# Patient Record
Sex: MALE | Race: BLACK OR AFRICAN AMERICAN | Employment: UNEMPLOYED | URBAN - METROPOLITAN AREA
[De-identification: names, ages, dates, MRNs, and addresses within clinical notes are randomized per-mention and may not be internally consistent; named-entity substitution may affect disease eponyms.]

---

## 2018-04-25 ENCOUNTER — OFFICE VISIT (OUTPATIENT)
Dept: FAMILY MEDICINE CLINIC | Age: 63
End: 2018-04-25

## 2018-04-25 VITALS
WEIGHT: 133.8 LBS | BODY MASS INDEX: 19.82 KG/M2 | OXYGEN SATURATION: 99 % | TEMPERATURE: 96.6 F | HEIGHT: 69 IN | SYSTOLIC BLOOD PRESSURE: 131 MMHG | DIASTOLIC BLOOD PRESSURE: 74 MMHG | HEART RATE: 80 BPM

## 2018-04-25 DIAGNOSIS — Z00.00 ANNUAL PHYSICAL EXAM: Primary | ICD-10-CM

## 2018-04-25 DIAGNOSIS — H54.8 LEGALLY BLIND: ICD-10-CM

## 2018-04-25 DIAGNOSIS — Z76.89 ENCOUNTER TO ESTABLISH CARE: ICD-10-CM

## 2018-04-25 DIAGNOSIS — E78.2 MIXED HYPERLIPIDEMIA: ICD-10-CM

## 2018-04-25 DIAGNOSIS — G47.00 INSOMNIA, UNSPECIFIED TYPE: ICD-10-CM

## 2018-04-25 DIAGNOSIS — Z77.22 TOBACCO SMOKE EXPOSURE: ICD-10-CM

## 2018-04-25 DIAGNOSIS — I10 ESSENTIAL HYPERTENSION: ICD-10-CM

## 2018-04-25 PROBLEM — I15.9 SECONDARY HYPERTENSION: Status: ACTIVE | Noted: 2018-04-25

## 2018-04-25 LAB
BILIRUB UR QL STRIP: NEGATIVE
GLUCOSE UR-MCNC: NEGATIVE MG/DL
KETONES P FAST UR STRIP-MCNC: NEGATIVE MG/DL
PH UR STRIP: 5.5 [PH] (ref 4.6–8)
PROT UR QL STRIP: NEGATIVE
SP GR UR STRIP: 1.02 (ref 1–1.03)
UA UROBILINOGEN AMB POC: NORMAL (ref 0.2–1)
URINALYSIS CLARITY POC: CLEAR
URINALYSIS COLOR POC: NORMAL
URINE BLOOD POC: NEGATIVE
URINE LEUKOCYTES POC: NEGATIVE
URINE NITRITES POC: NEGATIVE

## 2018-04-25 RX ORDER — TRAZODONE HYDROCHLORIDE 50 MG/1
50 TABLET ORAL
COMMUNITY
End: 2018-05-10

## 2018-04-25 NOTE — PROGRESS NOTES
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 He has been following up with Dr. Sulema Osborne at [facility], they are not taking his insurance anymore so he had to find a new primary care provider. She does not know none of his medication except had a bottle of trazodone. He is legally blind and he is here with his cousin who is his power of  as well. Records need to be requested from previous doctor. Today will include his physical as well so we can get all the blood work done, since he has not had his physical for this year, and it difficult for the patient to be brought back again. Past Medical History:   Diagnosis Date    Blindness 1996    Glaucoma     Hypercholesterolemia     Hypertension     Tremors of nervous system      Past Surgical History:   Procedure Laterality Date    HX HIP REPLACEMENT       Social History   Substance Use Topics    Smoking status: Current Every Day Smoker     Packs/day: 0.25    Smokeless tobacco: Never Used    Alcohol use No       History reviewed. No pertinent family history. Review of Systems   Constitutional: Negative for chills, fever, malaise/fatigue and weight loss. HENT: Negative for congestion, ear discharge, ear pain, hearing loss, nosebleeds, sinus pain and sore throat. Eyes: Negative for blurred vision, double vision and discharge. Respiratory: Negative for cough, hemoptysis, sputum production, shortness of breath and wheezing. Cardiovascular: Negative for chest pain, palpitations, claudication and leg swelling. Gastrointestinal: Negative for abdominal pain, constipation, diarrhea, nausea and vomiting. Genitourinary: Positive for frequency. Negative for dysuria and urgency. Musculoskeletal: Negative for back pain, joint pain, myalgias and neck pain. Skin: Negative for itching and rash. Neurological: Negative for dizziness, tingling, sensory change, speech change, focal weakness, weakness and headaches. Psychiatric/Behavioral: Negative for depression, hallucinations, substance abuse and suicidal ideas. The patient is not nervous/anxious and does not have insomnia. Physical Exam   Constitutional: He is oriented to person, place, and time. He appears well-developed and well-nourished. No distress. HENT:   Head: Normocephalic and atraumatic. Mouth/Throat: No oropharyngeal exudate. Legally blind in both eyes, the shape of sclera is white in color. Patient had a very poor dental hygiene and he barely have any teeth. He sees damage she was dental caries. Eyes: Conjunctivae are normal. Pupils are equal, round, and reactive to light. Right eye exhibits no discharge. Left eye exhibits no discharge. No scleral icterus. Neck: Normal range of motion. Neck supple. No thyromegaly present. Cardiovascular: Normal rate, regular rhythm and normal heart sounds. Pulmonary/Chest: Effort normal and breath sounds normal. No respiratory distress. He has no rales. Abdominal: Soft. Bowel sounds are normal. He exhibits no distension and no mass. There is no tenderness. There is no rebound. Musculoskeletal: Normal range of motion. He exhibits no edema, tenderness or deformity. Lymphadenopathy:     He has no cervical adenopathy. Neurological: He is alert and oriented to person, place, and time. No cranial nerve deficit. Coordination normal.   Skin: Skin is warm and dry. No rash noted. He is not diaphoretic. No erythema. Psychiatric: He has a normal mood and affect.  Judgment and thought content normal. Assessment and plan     1. Encounter to establish care      2. Insomnia, unspecified type  Patient has been taking trazodone at night 50 mg to sleep    3. Mixed hyperlipidemia      4. Legally blind      5. Tobacco smoke exposure      6. Annual physical exam    - CBC WITH AUTOMATED DIFF; Future  - LIPID PANEL; Future  - METABOLIC PANEL, COMPREHENSIVE; Future  - AMB POC URINALYSIS DIP STICK AUTO W/ MICRO  - CBC WITH AUTOMATED DIFF  - LIPID PANEL  - METABOLIC PANEL, COMPREHENSIVE    7. Essential hypertension  Blood pressure is 131/74 today I wait for the records from previous doctor and see what medication he is on    Current Outpatient Prescriptions   Medication Sig Dispense Refill    traZODone (DESYREL) 50 mg tablet Take 50 mg by mouth nightly. Patient Active Problem List    Diagnosis Date Noted    Insomnia 04/25/2018    Mixed hyperlipidemia 04/25/2018    Legally blind 04/25/2018    Essential hypertension 04/25/2018     No results found for this or any previous visit. No results found for any previous visit. Follow-up Disposition:  Return in about 1 week (around 5/2/2018).

## 2018-04-25 NOTE — MR AVS SNAPSHOT
[address] [facility] 
 
 
 [address] [facility] [address] [facility] [address] 
[phone] Patient: Tee Salinas MRN: [id number] Visit Information Date & Time Provider Department Dept. Phone Encounter #  
 4/25/2018  1:30 PM Cristina Mcneal MD [facility] [place] [phone] [id number] Follow-up Instructions Return in about 1 week (around 5/2/2018). Your Appointments 5/10/2018  1:30 PM  
Office Visit with Cristina Mcneal MD  
[address] [facility]) Appt Note: 2 week f/u from 4/25/18  
 [address] [facility] [address] [place] [address]  
[phone]  
  
   
 [facility] [address] [id number] Upcoming Health Maintenance Date Due Hepatitis C Screening 1955 DTaP/Tdap/Td series (1 - Tdap) 6/12/1978 FOBT Q 1 YEAR AGE 50-75 6/12/2007 ZOSTER VACCINE AGE 60> 4/12/2017 Influenza Age 5 to Adult 8/1/2017 Allergies as of 4/25/2018  Review Complete On: 4/25/2018 By: Cristina Mcneal MD  
 Not on File Current Immunizations  Never Reviewed No immunizations on file. Not reviewed this visit You Were Diagnosed With   
  
 Codes Comments Annual physical exam    -  Primary ICD-10-CM: Z00.00 ICD-9-CM: V70.0 Encounter to establish care     ICD-10-CM: Z76.89 
ICD-9-CM: V65.8 Insomnia, unspecified type     ICD-10-CM: G47.00 ICD-9-CM: 780.52 Mixed hyperlipidemia     ICD-10-CM: E78.2 ICD-9-CM: 272.2 Legally blind     ICD-10-CM: H54.8 ICD-9-CM: 369.4 Tobacco smoke exposure     ICD-10-CM: Z77.22 
ICD-9-CM: V15.89 Essential hypertension     ICD-10-CM: I10 
ICD-9-CM: 401.9 Vitals  BP Pulse Temp Height(growth percentile) Weight(growth percentile) SpO2  
 131/74 (BP 1 Location: Right arm, BP Patient Position: Sitting) 80 96.6 °F (35.9 °C) (Oral) 5' 9\" (1.753 m) 133 lb 12.8 oz (60.7 kg) 99% BMI Smoking Status 19.76 kg/m2 Current Every Day Smoker BMI and BSA Data Body Mass Index Body Surface Area 19.76 kg/m 2 1.72 m 2 Your Updated Medication List  
  
   
This list is accurate as of 4/25/18  2:55 PM.  Always use your most recent med list.  
  
  
  
  
 traZODone 50 mg tablet Commonly known as:  Ashley  Take 50 mg by mouth nightly. We Performed the Following AMB POC URINALYSIS DIP STICK AUTO W/ MICRO [32015 CPT(R)] CBC WITH AUTOMATED DIFF [48644 CPT(R)] LIPID PANEL [34898 CPT(R)] METABOLIC PANEL, COMPREHENSIVE [08928 CPT(R)] Follow-up Instructions Return in about 1 week (around 5/2/2018). To-Do List   
 04/25/2018 Lab:  CBC WITH AUTOMATED DIFF   
  
 04/25/2018 Lab:  LIPID PANEL   
  
 04/25/2018 Lab:  METABOLIC PANEL, COMPREHENSIVE Introducing [facility]! Leif Sheppard introduces [employer] patient portal. Now you can access parts of your medical record, email your doctor's office, and request medication refills online. 1. In your internet browser, go to [email or web address] 2. Click on the First Time User? Click Here link in the Sign In box. You will see the New Member Sign Up page. 3. Enter your [employer] Access Code exactly as it appears below. You will not need to use this code after youve completed the sign-up process. If you do not sign up before the expiration date, you must request a new code. · [employer] Access Code: [email or web address] Expires: 7/24/2018  2:27 PM 
 
4. Enter the last four digits of your Social Security Number (xxxx) and Date of Birth (mm/dd/yyyy) as indicated and click Submit. You will be taken to the next sign-up page. 5. Create a [employer] ID. This will be your [employer] login ID and cannot be changed, so think of one that is secure and easy to remember. 6. Create a [employer] password. You can change your password at any time. 7. Enter your Password Reset Question and Answer. This can be used at a later time if you forget your password. 8. Enter your e-mail address. You will receive e-mail notification when new information is available in [address] 9. Click Sign Up. You can now view and download portions of your medical record. 10. Click the Download Summary menu link to download a portable copy of your medical information. If you have questions, please visit the Frequently Asked Questions section of the [employer] website. Remember, [employer] is NOT to be used for urgent needs. For medical emergencies, dial 911. Now available from your iPhone and Android! Please provide this summary of care documentation to your next provider. Your primary care clinician is listed as Romero Shore. If you have any questions after today's visit, please call [phone].

## 2018-04-25 NOTE — PROGRESS NOTES
Margie Roth is a 63 y.o. male (: 1955) presenting to address:    No chief complaint on file. Vitals:    18 1405   BP: 131/74   Pulse: 80   Temp: 96.6 °F (35.9 °C)   TempSrc: Oral   SpO2: 99%   Weight: 133 lb 12.8 oz (60.7 kg)   Height: 5' 9\" (1.753 m)   PainSc:   0 - No pain       Hearing/Vision:   No exam data present    Learning Assessment:     Learning Assessment 2018   PRIMARY LEARNER Patient   HIGHEST LEVEL OF EDUCATION - PRIMARY LEARNER  GRADUATED HIGH SCHOOL OR GED   BARRIERS PRIMARY LEARNER VISUAL   CO-LEARNER CAREGIVER Yes   CO-LEARNER HIGHEST LEVEL OF EDUCATION GRADUATED HIGH SCHOOL OR GED   PRIMARY LANGUAGE ENGLISH   LEARNER PREFERENCE PRIMARY LISTENING   ANSWERED BY Lacey PRECIADO OTHER     Depression Screening:     PHQ over the last two weeks 2018   Little interest or pleasure in doing things Not at all   Feeling down, depressed or hopeless Not at all   Total Score PHQ 2 0     Fall Risk Assessment:     Fall Risk Assessment, last 12 mths 2018   Able to walk? Yes   Fall in past 12 months? No     Abuse Screening:     Abuse Screening Questionnaire 2018   Do you ever feel afraid of your partner? N   Are you in a relationship with someone who physically or mentally threatens you? N   Is it safe for you to go home? Y     Coordination of Care Questionaire:   1. Have you been to the ER, urgent care clinic since your last visit? Hospitalized since your last visit? NO    2. Have you seen or consulted any other health care providers outside of the [address] [place] [address] since your last visit? Include any pap smears or colon screening.  NO

## 2018-04-26 LAB
A-G RATIO,AGRAT: 1.4 RATIO (ref 1.1–2.6)
ABSOLUTE LYMPHOCYTE COUNT, 10803: 2.7 K/UL (ref 1–4.8)
ALBUMIN SERPL-MCNC: 4.4 G/DL (ref 3.5–5)
ALP SERPL-CCNC: 105 U/L (ref 40–125)
ALT SERPL-CCNC: 13 U/L (ref 5–40)
ANION GAP SERPL CALC-SCNC: 17 MMOL/L
AST SERPL W P-5'-P-CCNC: 17 U/L (ref 10–37)
BASOPHILS # BLD: 0 K/UL (ref 0–0.2)
BASOPHILS NFR BLD: 1 % (ref 0–2)
BILIRUB SERPL-MCNC: 0.4 MG/DL (ref 0.2–1.2)
BUN SERPL-MCNC: 14 MG/DL (ref 6–22)
CALCIUM SERPL-MCNC: 9.3 MG/DL (ref 8.4–10.4)
CHLORIDE SERPL-SCNC: 97 MMOL/L (ref 98–110)
CHOLEST SERPL-MCNC: 220 MG/DL (ref 110–200)
CO2 SERPL-SCNC: 26 MMOL/L (ref 20–32)
CREAT SERPL-MCNC: 0.9 MG/DL (ref 0.8–1.6)
EOSINOPHIL # BLD: 0.1 K/UL (ref 0–0.5)
EOSINOPHIL NFR BLD: 1 % (ref 0–6)
ERYTHROCYTE [DISTWIDTH] IN BLOOD BY AUTOMATED COUNT: 14.8 % (ref 10–15.5)
GFRAA, 66117: >60
GFRNA, 66118: >60
GLOBULIN,GLOB: 3.1 G/DL (ref 2–4)
GLUCOSE SERPL-MCNC: 78 MG/DL (ref 70–99)
GRANULOCYTES,GRANS: 47 % (ref 40–75)
HCT VFR BLD AUTO: 42.7 % (ref 39.3–51.6)
HDLC SERPL-MCNC: 4.8 MG/DL (ref 0–5)
HDLC SERPL-MCNC: 46 MG/DL (ref 40–59)
HGB BLD-MCNC: 14 G/DL (ref 13.1–17.2)
LDLC SERPL CALC-MCNC: 143 MG/DL (ref 50–99)
LYMPHOCYTES, LYMLT: 43 % (ref 20–45)
MCH RBC QN AUTO: 30 PG (ref 26–34)
MCHC RBC AUTO-ENTMCNC: 33 G/DL (ref 31–36)
MCV RBC AUTO: 91 FL (ref 80–95)
MONOCYTES # BLD: 0.5 K/UL (ref 0.1–1)
MONOCYTES NFR BLD: 8 % (ref 3–12)
NEUTROPHILS # BLD AUTO: 3 K/UL (ref 1.8–7.7)
PLATELET # BLD AUTO: 307 K/UL (ref 140–440)
PMV BLD AUTO: 9.6 FL (ref 9–13)
POTASSIUM SERPL-SCNC: 4.5 MMOL/L (ref 3.5–5.5)
PROT SERPL-MCNC: 7.5 G/DL (ref 6.2–8.1)
RBC # BLD AUTO: 4.71 M/UL (ref 3.8–5.8)
SODIUM SERPL-SCNC: 140 MMOL/L (ref 133–145)
TRIGL SERPL-MCNC: 152 MG/DL (ref 40–149)
VLDLC SERPL CALC-MCNC: 30 MG/DL (ref 8–30)
WBC # BLD AUTO: 6.3 K/UL (ref 4–11)

## 2018-05-02 ENCOUNTER — TELEPHONE (OUTPATIENT)
Dept: FAMILY MEDICINE CLINIC | Age: 63
End: 2018-05-02

## 2018-05-02 NOTE — TELEPHONE ENCOUNTER
Ryan Buck is calling from [place]. The patient has been assigned a . You have been invited to participate in a  conference call May 9, 2018 @ 2:00pm in regards to patients care. Doctor or nurse may participate [phone] meeting # [id number].

## 2018-05-10 ENCOUNTER — OFFICE VISIT (OUTPATIENT)
Dept: FAMILY MEDICINE CLINIC | Age: 63
End: 2018-05-10

## 2018-05-10 VITALS
HEIGHT: 69 IN | HEART RATE: 86 BPM | DIASTOLIC BLOOD PRESSURE: 80 MMHG | RESPIRATION RATE: 17 BRPM | SYSTOLIC BLOOD PRESSURE: 141 MMHG | WEIGHT: 134 LBS | BODY MASS INDEX: 19.85 KG/M2 | TEMPERATURE: 97.7 F | OXYGEN SATURATION: 100 %

## 2018-05-10 DIAGNOSIS — I10 ESSENTIAL HYPERTENSION: ICD-10-CM

## 2018-05-10 DIAGNOSIS — G47.00 INSOMNIA, UNSPECIFIED TYPE: ICD-10-CM

## 2018-05-10 DIAGNOSIS — Z12.11 SCREENING FOR COLON CANCER: ICD-10-CM

## 2018-05-10 DIAGNOSIS — E78.2 MIXED HYPERLIPIDEMIA: Primary | ICD-10-CM

## 2018-05-10 DIAGNOSIS — H61.23 BILATERAL IMPACTED CERUMEN: ICD-10-CM

## 2018-05-10 RX ORDER — ATORVASTATIN CALCIUM 40 MG/1
40 TABLET, FILM COATED ORAL DAILY
Qty: 30 TAB | Refills: 2 | Status: SHIPPED | OUTPATIENT
Start: 2018-05-10 | End: 2018-07-02 | Stop reason: SDUPTHER

## 2018-05-10 RX ORDER — DIPHENHYDRAMINE HCL 25 MG
25 CAPSULE ORAL
COMMUNITY
End: 2020-06-30

## 2018-05-10 RX ORDER — TRAZODONE HYDROCHLORIDE 50 MG/1
50 TABLET ORAL
Qty: 90 TAB | Refills: 0 | Status: SHIPPED | OUTPATIENT
Start: 2018-05-10 | End: 2018-12-10 | Stop reason: SDUPTHER

## 2018-05-10 RX ORDER — LOSARTAN POTASSIUM 50 MG/1
50 TABLET ORAL DAILY
Qty: 30 TAB | Refills: 2 | Status: SHIPPED | OUTPATIENT
Start: 2018-05-10 | End: 2018-07-02 | Stop reason: SDUPTHER

## 2018-05-10 NOTE — PROGRESS NOTES
Mario Lazo is a 63 y.o. male presents to office for 2 week follow up and left ear clogged and nervous        Health Maintenance items with a due date reviewed with patient:  Health Maintenance Due   Topic Date Due    Hepatitis C Screening  1955    Pneumococcal 19-64 Medium Risk (1 of 1 - PPSV23) 06/12/1976    DTaP/Tdap/Td series (1 - Tdap) 06/12/1978    FOBT Q 1 YEAR AGE 50-75  06/12/2007    ZOSTER VACCINE AGE 60>  04/12/2017

## 2018-05-10 NOTE — PROGRESS NOTES
Ej Cruz     Chief Complaint   Patient presents with    Anxiety    Ear Fullness     Vitals:    05/10/18 1338   BP: 141/80   Pulse: 86   Resp: 17   Temp: 97.7 °F (36.5 °C)   TempSrc: Oral   SpO2: 100%   Weight: 134 lb (60.8 kg)   Height: 5' 9\" (1.753 m)   PainSc:   0 - No pain         HPI: Patient is here for follow-up on his blood work, hypertension, and hyperlipidemia and insomnia medication refill. We have not received the records from his doctor yet. He is complaining about decreased hearing for both ears especially the right side for a few weeks. Otherwise he has no other complaints    Past Medical History:   Diagnosis Date    Blindness 1996    Glaucoma     Hypercholesterolemia     Hypertension     Tremors of nervous system      Past Surgical History:   Procedure Laterality Date    HX HIP REPLACEMENT       Social History   Substance Use Topics    Smoking status: Current Every Day Smoker     Packs/day: 0.25    Smokeless tobacco: Never Used    Alcohol use No       History reviewed. No pertinent family history. Review of Systems   Constitutional: Negative for chills, fever, malaise/fatigue and weight loss. HENT: Positive for hearing loss. Negative for congestion, ear discharge, ear pain, nosebleeds, sinus pain and sore throat. Decreased hearing in the right side   Eyes: Negative for blurred vision, double vision and discharge. Respiratory: Negative for cough, hemoptysis, sputum production, shortness of breath and wheezing. Cardiovascular: Negative for chest pain, palpitations, claudication and leg swelling. Gastrointestinal: Positive for heartburn. Negative for abdominal pain, constipation, diarrhea, nausea and vomiting. Genitourinary: Negative for dysuria, frequency and urgency. Musculoskeletal: Negative for back pain, joint pain, myalgias and neck pain. Skin: Negative for itching and rash.    Neurological: Negative for dizziness, tingling, sensory change, speech change, focal weakness, weakness and headaches. Psychiatric/Behavioral: Negative for depression, hallucinations, substance abuse and suicidal ideas. The patient has insomnia. The patient is not nervous/anxious. Physical Exam   Constitutional: He is oriented to person, place, and time. He appears well-developed and well-nourished. No distress. HENT:   Head: Normocephalic and atraumatic. Mouth/Throat: No oropharyngeal exudate. Bilateral cerumen impaction   Eyes: Conjunctivae are normal. Pupils are equal, round, and reactive to light. Right eye exhibits no discharge. Left eye exhibits no discharge. No scleral icterus. Neck: Normal range of motion. Neck supple. No thyromegaly present. Cardiovascular: Normal rate, regular rhythm and normal heart sounds. Pulmonary/Chest: Effort normal and breath sounds normal. No respiratory distress. He has no rales. Abdominal: Soft. Bowel sounds are normal. He exhibits no distension and no mass. There is no tenderness. There is no rebound. Musculoskeletal: Normal range of motion. He exhibits no edema, tenderness or deformity. Lymphadenopathy:     He has no cervical adenopathy. Neurological: He is alert and oriented to person, place, and time. No cranial nerve deficit. Coordination normal.   Skin: Skin is warm and dry. No rash noted. He is not diaphoretic. No erythema. Psychiatric: He has a normal mood and affect. Judgment and thought content normal.        Assessment and plan     1. Mixed hyperlipidemia  Hyperlipidemia will be started on atorvastatin 40 mg daily he is a smoker hypertension hyperlipidemia was high risk for coronary disease.    - atorvastatin (LIPITOR) 40 mg tablet; Take 1 Tab by mouth daily. Dispense: 30 Tab; Refill: 2    2. Essential hypertension  Since his old records has not arrived yet at our office will start him on losartan 50 mg daily  - losartan (COZAAR) 50 mg tablet; Take 1 Tab by mouth daily. Dispense: 30 Tab;  Refill: 2    3. Insomnia, unspecified type    - traZODone (DESYREL) 50 mg tablet; Take 1 Tab by mouth nightly. Dispense: 90 Tab; Refill: 0    4. Screening for colon cancer    [facility] ref [facility]    5. Bilateral impacted cerumen  Bilateral ear lavage was done by the nurse Caryl Phelan    Current Outpatient Prescriptions   Medication Sig Dispense Refill    losartan (COZAAR) 50 mg tablet Take 1 Tab by mouth daily. 30 Tab 2    atorvastatin (LIPITOR) 40 mg tablet Take 1 Tab by mouth daily. 30 Tab 2    diphenhydrAMINE (BENADRYL) 25 mg capsule Take 25 mg by mouth every six (6) hours as needed.  traZODone (DESYREL) 50 mg tablet Take 1 Tab by mouth nightly. 90 Tab 0       Patient Active Problem List    Diagnosis Date Noted    Insomnia 04/25/2018    Mixed hyperlipidemia 04/25/2018    Legally blind 04/25/2018    Essential hypertension 04/25/2018     Results for orders placed or performed in visit on 04/25/18   CBC WITH AUTOMATED DIFF   Result Value Ref Range    WBC 6.3 4.0 - 11.0 K/uL    RBC 4.71 3.80 - 5.80 M/uL    HGB 14.0 13.1 - 17.2 g/dL    HCT 42.7 39.3 - 51.6 %    MCV 91 80 - 95 fL    MCH 30 26 - 34 pg    MCHC 33 31 - 36 g/dL    RDW 14.8 10.0 - 15.5 %    PLATELET 245 897 - 458 K/uL    MPV 9.6 9.0 - 13.0 fL    NEUTROPHILS 47 40 - 75 %    Lymphocytes 43 20 - 45 %    MONOCYTES 8 3 - 12 %    EOSINOPHILS 1 0 - 6 %    BASOPHILS 1 0 - 2 %    ABS. NEUTROPHILS 3.0 1.8 - 7.7 K/uL    ABSOLUTE LYMPHOCYTE COUNT 2.7 1.0 - 4.8 K/uL    ABS. MONOCYTES 0.5 0.1 - 1.0 K/uL    ABS. EOSINOPHILS 0.1 0.0 - 0.5 K/uL    ABS.  BASOPHILS 0.0 0.0 - 0.2 K/uL   LIPID PANEL   Result Value Ref Range    Triglyceride 152 (H) 40 - 149 mg/dL    HDL Cholesterol 46 40 - 59 mg/dL    Cholesterol, total 220 (H) 110 - 200 mg/dL    CHOLESTEROL/HDL 4.8 0.0 - 5.0    LDL, calculated 143 (H) 50 - 99 mg/dL    VLDL, calculated 30 8 - 30 mg/dL   METABOLIC PANEL, COMPREHENSIVE   Result Value Ref Range    Glucose 78 70 - 99 mg/dL    BUN 14 6 - 22 mg/dL Creatinine 0.9 0.8 - 1.6 mg/dL    Sodium 140 133 - 145 mmol/L    Potassium 4.5 3.5 - 5.5 mmol/L    Chloride 97 (L) 98 - 110 mmol/L    CO2 26 20 - 32 mmol/L    AST (SGOT) 17 10 - 37 U/L    ALT (SGPT) 13 5 - 40 U/L    Alk.  phosphatase 105 40 - 125 U/L    Bilirubin, total 0.4 0.2 - 1.2 mg/dL    Calcium 9.3 8.4 - 10.4 mg/dL    Protein, total 7.5 6.2 - 8.1 g/dL    Albumin 4.4 3.5 - 5.0 g/dL    A-G Ratio 1.4 1.1 - 2.6 ratio    Globulin 3.1 2.0 - 4.0 g/dL    Anion gap 17.0 mmol/L    GFRAA >60.0 >60.0    GFRNA >60.0 >60.0   AMB POC URINALYSIS DIP STICK AUTO W/ MICRO   Result Value Ref Range    Color (UA POC) Orange     Clarity (UA POC) Clear     Glucose (UA POC) Negative Negative    Bilirubin (UA POC) Negative Negative    Ketones (UA POC) Negative Negative    Specific gravity (UA POC) 1.025 1.001 - 1.035    Blood (UA POC) Negative Negative    pH (UA POC) 5.5 4.6 - 8.0    Protein (UA POC) Negative Negative    Urobilinogen (UA POC) 0.2 mg/dL 0.2 - 1    Nitrites (UA POC) Negative Negative    Leukocyte esterase (UA POC) Negative Negative     Office Visit on 04/25/2018   Component Date Value Ref Range Status    Color (UA POC) 04/25/2018 [place]   Final    Clarity (UA POC) 04/25/2018 Clear   Final    Glucose (UA POC) 04/25/2018 Negative  Negative Final    Bilirubin (UA POC) 04/25/2018 Negative  Negative Final    Ketones (UA POC) 04/25/2018 Negative  Negative Final    Specific gravity (UA POC) 04/25/2018 1.025  1.001 - 1.035 Final    Blood (UA POC) 04/25/2018 Negative  Negative Final    pH (UA POC) 04/25/2018 5.5  4.6 - 8.0 Final    Protein (UA POC) 04/25/2018 Negative  Negative Final    Urobilinogen (UA POC) 04/25/2018 0.2 mg/dL  0.2 - 1 Final    Nitrites (UA POC) 04/25/2018 Negative  Negative Final    Leukocyte esterase (UA POC) 04/25/2018 Negative  Negative Final    WBC 04/25/2018 6.3  4.0 - 11.0 K/uL Final    RBC 04/25/2018 4.71  3.80 - 5.80 M/uL Final    HGB 04/25/2018 14.0  13.1 - 17.2 g/dL Final    HCT 04/25/2018 42.7  39.3 - 51.6 % Final    MCV 04/25/2018 91  80 - 95 fL Final    MCH 04/25/2018 30  26 - 34 pg Final    MCHC 04/25/2018 33  31 - 36 g/dL Final    RDW 04/25/2018 14.8  10.0 - 15.5 % Final    PLATELET 93/51/2114 778  140 - 440 K/uL Final    MPV 04/25/2018 9.6  9.0 - 13.0 fL Final    NEUTROPHILS 04/25/2018 47  40 - 75 % Final    Lymphocytes 04/25/2018 43  20 - 45 % Final    MONOCYTES 04/25/2018 8  3 - 12 % Final    EOSINOPHILS 04/25/2018 1  0 - 6 % Final    BASOPHILS 04/25/2018 1  0 - 2 % Final    ABS. NEUTROPHILS 04/25/2018 3.0  1.8 - 7.7 K/uL Final    ABSOLUTE LYMPHOCYTE COUNT 04/25/2018 2.7  1.0 - 4.8 K/uL Final    ABS. MONOCYTES 04/25/2018 0.5  0.1 - 1.0 K/uL Final    ABS. EOSINOPHILS 04/25/2018 0.1  0.0 - 0.5 K/uL Final    ABS. BASOPHILS 04/25/2018 0.0  0.0 - 0.2 K/uL Final    Triglyceride 04/25/2018 152* 40 - 149 mg/dL Final    HDL Cholesterol 04/25/2018 46  40 - 59 mg/dL Final    Cholesterol, total 04/25/2018 220* 110 - 200 mg/dL Final    CHOLESTEROL/HDL 04/25/2018 4.8  0.0 - 5.0 Final    LDL, calculated 04/25/2018 143* 50 - 99 mg/dL Final    VLDL, calculated 04/25/2018 30  8 - 30 mg/dL Final    Comment: Test includes cholesterol, HDL cholesterol, triglycerides and LDL. Cholesterol Recommended NCEP guidelines in mg/dL:  Less than 200            Desirable  200 - 239                Borderline High  Greater than or  = 240   High  Please Note:  Total Chol/HDL Ratio                   Men     Women  1/2 Avg. Risk    3.4     3.3      Avg. Risk    5.0     4.4  2X  Avg. Risk    9.6     7.1  3X  Avg.  Risk   23.4    11.0      Glucose 04/25/2018 78  70 - 99 mg/dL Final    BUN 04/25/2018 14  6 - 22 mg/dL Final    Creatinine 04/25/2018 0.9  0.8 - 1.6 mg/dL Final    Sodium 04/25/2018 140  133 - 145 mmol/L Final    Potassium 04/25/2018 4.5  3.5 - 5.5 mmol/L Final    Chloride 04/25/2018 97* 98 - 110 mmol/L Final    CO2 04/25/2018 26  20 - 32 mmol/L Final    AST (SGOT) 04/25/2018 17 10 - 37 U/L Final    ALT (SGPT) 04/25/2018 13  5 - 40 U/L Final    Alk. phosphatase 04/25/2018 105  40 - 125 U/L Final    Bilirubin, total 04/25/2018 0.4  0.2 - 1.2 mg/dL Final    Calcium 04/25/2018 9.3  8.4 - 10.4 mg/dL Final    Protein, total 04/25/2018 7.5  6.2 - 8.1 g/dL Final    Albumin 04/25/2018 4.4  3.5 - 5.0 g/dL Final    A-G Ratio 04/25/2018 1.4  1.1 - 2.6 ratio Final    Globulin 04/25/2018 3.1  2.0 - 4.0 g/dL Final    Anion gap 04/25/2018 17.0  mmol/L Final    Comment: Test includes Albumin, Alkaline Phosphatase, ALT, AST, BUN, Calcium, CO2,  Chloride, Creatinine, Glucose, Potassium, Sodium, Total Bilirubin and Total  Protein. Estimated GFR results are reported in mL/min/1.73 sq.m. by the MDRD equation. This eGFR is validated for stable chronic renal failure patients. This   equation  is unreliable in acute illness or patients with normal renal function.  GFRAA 04/25/2018 >60.0  >60.0 Final    GFRNA 04/25/2018 >60.0  >60.0 Final          Follow-up Disposition:  Return in about 3 months (around 8/10/2018).

## 2018-07-02 DIAGNOSIS — I10 ESSENTIAL HYPERTENSION: ICD-10-CM

## 2018-07-02 DIAGNOSIS — E78.2 MIXED HYPERLIPIDEMIA: ICD-10-CM

## 2018-07-02 RX ORDER — LOSARTAN POTASSIUM 50 MG/1
50 TABLET ORAL DAILY
Qty: 90 TAB | Refills: 1 | Status: SHIPPED | OUTPATIENT
Start: 2018-07-02 | End: 2018-11-27 | Stop reason: SDUPTHER

## 2018-07-02 RX ORDER — ATORVASTATIN CALCIUM 40 MG/1
40 TABLET, FILM COATED ORAL DAILY
Qty: 90 TAB | Refills: 1 | Status: SHIPPED | OUTPATIENT
Start: 2018-07-02 | End: 2018-11-27 | Stop reason: SDUPTHER

## 2018-07-02 NOTE — TELEPHONE ENCOUNTER
Dr. Camille Stroud, please see refill request for patient, thank you! Requested Prescriptions     Pending Prescriptions Disp Refills    atorvastatin (LIPITOR) 40 mg tablet 90 Tab 1     Sig: Take 1 Tab by mouth daily.

## 2018-07-09 ENCOUNTER — OFFICE VISIT (OUTPATIENT)
Dept: SURGERY | Age: 63
End: 2018-07-09

## 2018-07-09 VITALS
RESPIRATION RATE: 16 BRPM | HEIGHT: 69 IN | OXYGEN SATURATION: 99 % | HEART RATE: 71 BPM | SYSTOLIC BLOOD PRESSURE: 134 MMHG | BODY MASS INDEX: 19.4 KG/M2 | TEMPERATURE: 96.2 F | DIASTOLIC BLOOD PRESSURE: 86 MMHG | WEIGHT: 131 LBS

## 2018-07-09 DIAGNOSIS — Z12.11 ENCOUNTER FOR SCREENING COLONOSCOPY: Primary | ICD-10-CM

## 2018-07-09 NOTE — PROGRESS NOTES
Colon Screen    Patient: Maribel Centeno MRN: [id number]  SSN: [id number]    YOB: 1955  Age: 66 y.o. Sex: male        Subjective:   Maribel Centeno presents for colon screening. PCP is Fili Phillips MD. Patient denies rectal pain or bleeding. Abdominal surgeries as described below, specifically none. Patient has a bowel movement 1-2 per day. Patient denies changes in weight, bowel habits, or appetite. Patient has no known family history of colon cancer. Patient has never had a colonoscopy. No Known Allergies    Past Medical History:   Diagnosis Date    Blindness 1996    Glaucoma     Hypercholesterolemia     Hypertension     Tremors of nervous system      Past Surgical History:   Procedure Laterality Date    HX HIP REPLACEMENT        History reviewed. No pertinent family history. Social History   Substance Use Topics    Smoking status: Current Every Day Smoker     Packs/day: 0.25    Smokeless tobacco: Never Used    Alcohol use No      Prior to Admission medications    Medication Sig Start Date End Date Taking? Authorizing Provider   atorvastatin (LIPITOR) 40 mg tablet Take 1 Tab by mouth daily. 7/2/18  Yes Fili Phillips MD   losartan (COZAAR) 50 mg tablet Take 1 Tab by mouth daily. 7/2/18  Yes Fili Phillips MD   diphenhydrAMINE (BENADRYL) 25 mg capsule Take 25 mg by mouth every six (6) hours as needed. Yes Historical Provider   traZODone (DESYREL) 50 mg tablet Take 1 Tab by mouth nightly. 5/10/18  Yes Fili Phillips MD          Review of Systems:  Gastrointestinal: negative      Risks colonoscopy described- colon injury, missed lesion, anesthesia problems, bleeding. Colonoscopy preparation reviewed in detail with patient and a copy of the instructions was provided to the patient.        Dionna Melchor LPN  July 9, 3234  4:78 PM

## 2018-07-09 NOTE — PATIENT INSTRUCTIONS
If you have any questions or concerns about today's appointment, the verbal and/or written instructions you were given for follow up care, please call our office at [phone].     [facility] - [address]    [phone] office  [phone]

## 2018-07-10 RX ORDER — POLYETHYLENE GLYCOL 3350, SODIUM SULFATE ANHYDROUS, SODIUM BICARBONATE, SODIUM CHLORIDE, POTASSIUM CHLORIDE 236; 22.74; 6.74; 5.86; 2.97 G/4L; G/4L; G/4L; G/4L; G/4L
POWDER, FOR SOLUTION ORAL
Qty: 1 BOTTLE | Refills: 0 | Status: SHIPPED | OUTPATIENT
Start: 2018-07-10 | End: 2020-03-19

## 2018-07-10 NOTE — PROGRESS NOTES
[address]  [address] [address]  [phone]    Colonoscopy History and Physical    Patient: Johny Rausch MRN: [id number]  SSN: [id number]    YOB: 1955  Age: 66 y.o. Sex: male      Subjective:      Johny Rausch is a 66 y.o. male who presents for colonoscopy for   Screening colonoscopy. Patient has no complaints. Patient reports family history and abdominal surgeries as noted below. Past Medical History:   Diagnosis Date    Blindness 1996    Glaucoma     Hypercholesterolemia     Hypertension     Tremors of nervous system      Past Surgical History:   Procedure Laterality Date    HX HIP REPLACEMENT        History reviewed. No pertinent family history. Social History   Substance Use Topics    Smoking status: Current Every Day Smoker     Packs/day: 0.25    Smokeless tobacco: Never Used    Alcohol use No      Prior to Admission medications    Medication Sig Start Date End Date Taking? Authorizing Provider   atorvastatin (LIPITOR) 40 mg tablet Take 1 Tab by mouth daily. 7/2/18  Yes Yousif Davis MD   losartan (COZAAR) 50 mg tablet Take 1 Tab by mouth daily. 7/2/18  Yes Yousif Davis MD   diphenhydrAMINE (BENADRYL) 25 mg capsule Take 25 mg by mouth every six (6) hours as needed. Yes Historical Provider   traZODone (DESYREL) 50 mg tablet Take 1 Tab by mouth nightly. 5/10/18  Yes Yousif Davis MD        No Known Allergies    Review of Systems:  Review of systems performed with findings as noted.     Objective:     Vitals:    07/09/18 1450   BP: 134/86   Pulse: 71   Resp: 16   Temp: 96.2 °F (35.7 °C)   TempSrc: Oral   SpO2: 99%   Weight: 59.4 kg (131 lb)   Height: 5' 9\" (1.753 m)        Physical Exam:  GENERAL: alert, cooperative, no distress, appears stated age  LUNG: clear to auscultation bilaterally  HEART: regular rate and rhythm, S1, S2 normal, no murmur, click, rub or gallop  ABDOMEN: soft, non-tender. Bowel sounds normal. No masses,  no organomegaly  NEUROLOGIC: negative  PSYCHIATRIC: non focal    Assessment:   Fabian Pichardo is a 66 y.o. male who presents for colonoscopy for   Screening colonoscopy    Plan:   1. I recommend proceeding with colonoscopy. The patient was in full agreement and was eager to proceed. 2. I discussed the details of the colonoscopy procedure. The risks of colonoscopy were discussed including colon injury/perforation, anesthesia issues, bleeding, and the possibility of incomplete examination. The patient was willing to accept these risks and proceed with the examination. All questions were answered to the patient's satisfaction. 3. The patient was provided with the instructions in preparation for the colonoscopy procedure including the bowel prep recommendations.

## 2018-07-24 ENCOUNTER — TELEPHONE (OUTPATIENT)
Dept: SURGERY | Age: 63
End: 2018-07-24

## 2018-07-24 RX ORDER — POLYETHYLENE GLYCOL 3350, SODIUM SULFATE ANHYDROUS, SODIUM BICARBONATE, SODIUM CHLORIDE, POTASSIUM CHLORIDE 236; 22.74; 6.74; 5.86; 2.97 G/4L; G/4L; G/4L; G/4L; G/4L
POWDER, FOR SOLUTION ORAL
Qty: 1 BOTTLE | Refills: 0 | Status: SHIPPED | OUTPATIENT
Start: 2018-07-24 | End: 2018-07-26

## 2018-07-24 NOTE — PERIOP NOTES
PAT - SURGICAL PRE-ADMISSION INSTRUCTIONS    NAME:  Ashlie Newell                                                          TODAY'S DATE:  7/24/2018    SURGERY DATE:  7/26/2018                                  SURGERY ARRIVAL TIME:   1300    1. Do NOT eat or drink anything, including candy or gum, after MIDNIGHT on 7/25/18 , unless you have specific instructions from your Surgeon or Anesthesia Provider to do so. 2. No smoking on the day of surgery. 3. No alcohol 24 hours prior to the day of surgery. 4. No recreational drugs for one week prior to the day of surgery. 5. Leave all valuables, including money/purse, at home. 6. Remove all jewelry, nail polish, makeup (including mascara); no lotions, powders, deodorant, or perfume/cologne/after shave. 7. Glasses/Contact lenses and Dentures may be worn to the hospital.  They will be removed prior to surgery. 8. Call your doctor if symptoms of a cold or illness develop within 24 ours prior to surgery. 9. AN ADULT MUST DRIVE YOU HOME AFTER OUTPATIENT SURGERY. 10. If you are having an OUTPATIENT procedure, please make arrangements for a responsible adult to be with you for 24 hours after your surgery. 11. If you are admitted to the hospital, you will be assigned to a bed after surgery is complete. Normally a family member will not be able to see you until you are in your assigned bed. 15. Family is encouraged to accompany you to the hospital.  We ask visitors in the treatment area to be limited to ONE person at a time to ensure patient privacy. EXCEPTIONS WILL BE MADE AS NEEDED. 15. Children under 12 are discouraged from entering the treatment area and need to be supervised by an adult when in the waiting room. Special Instructions:    Bring list of CURRENT medications . , Take these medications the morning of surgery with a sip of water:  PER , Complete bowel prep per MD instructions.     Patient Prep:    shower with anti-bacterial soap    These surgical instructions were reviewed with PATIENT during the PAT PHONE CALL. Directions: On the morning of surgery, please go to the [facility]. Enter the building from the [facility] entrance, 1st floor (next to the Emergency Room entrance). Take the elevator to the 2nd floor. Sign in at the Registration Desk.     If you have any questions and/or concerns, please do not hesitate to call:  (Prior to the day of surgery)  hospitals unit:  [phone]  (Day of surgery)  [facility] unit:  [phone]

## 2018-07-25 ENCOUNTER — ANESTHESIA EVENT (OUTPATIENT)
Dept: ENDOSCOPY | Age: 63
End: 2018-07-25

## 2018-07-26 ENCOUNTER — HOSPITAL ENCOUNTER (OUTPATIENT)
Age: 63
Setting detail: OUTPATIENT SURGERY
Discharge: HOME | End: 2018-07-26
Attending: COLON & RECTAL SURGERY | Admitting: COLON & RECTAL SURGERY
Payer: MEDICARE

## 2018-07-26 ENCOUNTER — ANESTHESIA (OUTPATIENT)
Dept: ENDOSCOPY | Age: 63
End: 2018-07-26

## 2018-07-26 VITALS
SYSTOLIC BLOOD PRESSURE: 116 MMHG | WEIGHT: 132 LBS | HEIGHT: 71 IN | OXYGEN SATURATION: 98 % | DIASTOLIC BLOOD PRESSURE: 74 MMHG | TEMPERATURE: 97.3 F | BODY MASS INDEX: 18.48 KG/M2 | RESPIRATION RATE: 16 BRPM | HEART RATE: 81 BPM

## 2018-07-26 PROCEDURE — 74011250636 HC RX REV CODE- 250/636

## 2018-07-26 PROCEDURE — 76060000032 HC ANESTHESIA 0.5 TO 1 HR: Performed by: COLON & RECTAL SURGERY

## 2018-07-26 PROCEDURE — G0500 MOD SEDAT ENDO SERVICE >5YRS: HCPCS | Performed by: COLON & RECTAL SURGERY

## 2018-07-26 PROCEDURE — 74011250636 HC RX REV CODE- 250/636: Performed by: COLON & RECTAL SURGERY

## 2018-07-26 PROCEDURE — 76040000007: Performed by: COLON & RECTAL SURGERY

## 2018-07-26 PROCEDURE — 77030031670 HC DEV INFL ENDOTEK BIG60 MRTM -B: Performed by: COLON & RECTAL SURGERY

## 2018-07-26 RX ORDER — NALOXONE HYDROCHLORIDE 0.4 MG/ML
0.4 INJECTION, SOLUTION INTRAMUSCULAR; INTRAVENOUS; SUBCUTANEOUS
Status: DISCONTINUED | OUTPATIENT
Start: 2018-07-26 | End: 2018-07-26 | Stop reason: HOSPADM

## 2018-07-26 RX ORDER — ATROPINE SULFATE 0.1 MG/ML
0.5 INJECTION INTRAVENOUS
Status: DISCONTINUED | OUTPATIENT
Start: 2018-07-26 | End: 2018-07-26 | Stop reason: HOSPADM

## 2018-07-26 RX ORDER — DEXTROMETHORPHAN/PSEUDOEPHED 2.5-7.5/.8
1.2 DROPS ORAL
Status: DISCONTINUED | OUTPATIENT
Start: 2018-07-26 | End: 2018-07-26 | Stop reason: HOSPADM

## 2018-07-26 RX ORDER — FAMOTIDINE 20 MG/1
20 TABLET, FILM COATED ORAL ONCE
Status: DISCONTINUED | OUTPATIENT
Start: 2018-07-26 | End: 2018-07-26 | Stop reason: HOSPADM

## 2018-07-26 RX ORDER — SODIUM CHLORIDE 0.9 % (FLUSH) 0.9 %
5-10 SYRINGE (ML) INJECTION EVERY 8 HOURS
Status: DISCONTINUED | OUTPATIENT
Start: 2018-07-26 | End: 2018-07-26 | Stop reason: HOSPADM

## 2018-07-26 RX ORDER — EPINEPHRINE 0.1 MG/ML
1 INJECTION INTRACARDIAC; INTRAVENOUS
Status: DISCONTINUED | OUTPATIENT
Start: 2018-07-26 | End: 2018-07-26 | Stop reason: HOSPADM

## 2018-07-26 RX ORDER — MIDAZOLAM HYDROCHLORIDE 1 MG/ML
.25-5 INJECTION, SOLUTION INTRAMUSCULAR; INTRAVENOUS
Status: DISCONTINUED | OUTPATIENT
Start: 2018-07-26 | End: 2018-07-26 | Stop reason: HOSPADM

## 2018-07-26 RX ORDER — SODIUM CHLORIDE 9 MG/ML
50 INJECTION, SOLUTION INTRAVENOUS CONTINUOUS
Status: DISCONTINUED | OUTPATIENT
Start: 2018-07-26 | End: 2018-07-26 | Stop reason: HOSPADM

## 2018-07-26 RX ORDER — SODIUM CHLORIDE, SODIUM LACTATE, POTASSIUM CHLORIDE, CALCIUM CHLORIDE 600; 310; 30; 20 MG/100ML; MG/100ML; MG/100ML; MG/100ML
50 INJECTION, SOLUTION INTRAVENOUS CONTINUOUS
Status: DISCONTINUED | OUTPATIENT
Start: 2018-07-26 | End: 2018-07-26 | Stop reason: HOSPADM

## 2018-07-26 RX ORDER — SODIUM CHLORIDE 0.9 % (FLUSH) 0.9 %
5-10 SYRINGE (ML) INJECTION AS NEEDED
Status: DISCONTINUED | OUTPATIENT
Start: 2018-07-26 | End: 2018-07-26 | Stop reason: HOSPADM

## 2018-07-26 RX ORDER — MIDAZOLAM HYDROCHLORIDE 1 MG/ML
INJECTION, SOLUTION INTRAMUSCULAR; INTRAVENOUS
Status: DISCONTINUED
Start: 2018-07-26 | End: 2018-07-26 | Stop reason: HOSPADM

## 2018-07-26 RX ORDER — FLUMAZENIL 0.1 MG/ML
0.2 INJECTION INTRAVENOUS
Status: DISCONTINUED | OUTPATIENT
Start: 2018-07-26 | End: 2018-07-26 | Stop reason: HOSPADM

## 2018-07-26 RX ADMIN — SODIUM CHLORIDE 50 ML/HR: 900 INJECTION, SOLUTION INTRAVENOUS at 15:01

## 2018-07-26 NOTE — INTERVAL H&P NOTE
H&P Update:  Mai Hong was seen and examined. History and physical has been reviewed. The patient has been examined.  There have been no significant clinical changes since the completion of the originally dated History and Physical.    Signed By: Darylene Low, MD     July 26, 2018 2:56 PM

## 2018-07-26 NOTE — PERIOP NOTES
Cousin not answering from [facility]. No number recorded. Called number in Demographics and it goes straight to     1751 Number on Demographics was called and picked up. PT's cousin will return to hospital to  pt.

## 2018-07-26 NOTE — H&P (VIEW-ONLY)
[address]  [address] [address]  [phone]    Colonoscopy History and Physical    Patient: Catherine Molina MRN: [id number]  SSN: [id number]    YOB: 1955  Age: 66 y.o. Sex: male      Subjective:      Catherine Molina is a 66 y.o. male who presents for colonoscopy for   Screening colonoscopy. Patient has no complaints. Patient reports family history and abdominal surgeries as noted below. Past Medical History:   Diagnosis Date    Blindness 1996    Glaucoma     Hypercholesterolemia     Hypertension     Tremors of nervous system      Past Surgical History:   Procedure Laterality Date    HX HIP REPLACEMENT        History reviewed. No pertinent family history. Social History   Substance Use Topics    Smoking status: Current Every Day Smoker     Packs/day: 0.25    Smokeless tobacco: Never Used    Alcohol use No      Prior to Admission medications    Medication Sig Start Date End Date Taking? Authorizing Provider   atorvastatin (LIPITOR) 40 mg tablet Take 1 Tab by mouth daily. 7/2/18  Yes Jack Crain MD   losartan (COZAAR) 50 mg tablet Take 1 Tab by mouth daily. 7/2/18  Yes Jack Crain MD   diphenhydrAMINE (BENADRYL) 25 mg capsule Take 25 mg by mouth every six (6) hours as needed. Yes Historical Provider   traZODone (DESYREL) 50 mg tablet Take 1 Tab by mouth nightly. 5/10/18  Yes Jack Crain MD        No Known Allergies    Review of Systems:  Review of systems performed with findings as noted.     Objective:     Vitals:    07/09/18 1450   BP: 134/86   Pulse: 71   Resp: 16   Temp: 96.2 °F (35.7 °C)   TempSrc: Oral   SpO2: 99%   Weight: 59.4 kg (131 lb)   Height: 5' 9\" (1.753 m)        Physical Exam:  GENERAL: alert, cooperative, no distress, appears stated age  LUNG: clear to auscultation bilaterally  HEART: regular rate and rhythm, S1, S2 normal, no murmur, click, rub or gallop  ABDOMEN: soft, non-tender. Bowel sounds normal. No masses,  no organomegaly  NEUROLOGIC: negative  PSYCHIATRIC: non focal    Assessment:   Corina Carlson is a 66 y.o. male who presents for colonoscopy for   Screening colonoscopy    Plan:   1. I recommend proceeding with colonoscopy. The patient was in full agreement and was eager to proceed. 2. I discussed the details of the colonoscopy procedure. The risks of colonoscopy were discussed including colon injury/perforation, anesthesia issues, bleeding, and the possibility of incomplete examination. The patient was willing to accept these risks and proceed with the examination. All questions were answered to the patient's satisfaction. 3. The patient was provided with the instructions in preparation for the colonoscopy procedure including the bowel prep recommendations.

## 2018-07-26 NOTE — IP AVS SNAPSHOT
[address] 
[phone] Patient: Zhanna Arias MRN: [id number] EUB:[id number] About your hospitalization You were admitted on:  July 26, 2018 You last received care in the:  [facility] PHASE 2 RECOVERY You were discharged on:  July 26, 2018 Why you were hospitalized Your primary diagnosis was:  Not on File Follow-up Information Follow up With Details Comments Contact Info Valli Boas, MD   [address] [facility] [address] 
[phone] Maida Allison MD  Please conmtact Dr Bri Urrutia for any questions [address] [place] [address] [address] [facility] [address] [address] 
[phone] Your Scheduled Appointments Tuesday August 07, 2018  1:00 PM EDT Follow Up with Valli Boas, MD  
[address] [place] [address] [address] [address]  
[phone] Discharge Orders None A check jourdan indicates which time of day the medication should be taken. My Medications CHANGE how you take these medications Instructions Each Dose to Equal  
 Morning Noon Evening Bedtime PEG 3350-Electrolytes 236-22.74-6.74 -5.86 gram suspension Commonly known as:  GO-LYTELY What changed:  Another medication with the same name was removed. Continue taking this medication, and follow the directions you see here. Your last dose was: Your next dose is: Take as directed CONTINUE taking these medications Instructions Each Dose to Equal  
 Morning Noon Evening Bedtime  
 atorvastatin 40 mg tablet Commonly known as:  LIPITOR Your last dose was: Your next dose is: Take 1 Tab by mouth daily. 40 mg  
    
   
   
   
  
 BENADRYL 25 mg capsule Generic drug:  diphenhydrAMINE Your last dose was: Your next dose is: Take 25 mg by mouth every six (6) hours as needed. 25 mg  
    
   
   
   
  
 losartan 50 mg tablet Commonly known as:  COZAAR Your last dose was: Your next dose is: Take 1 Tab by mouth daily. 50 mg  
    
   
   
   
  
 traZODone 50 mg tablet Commonly known as:  Josh Alexander Your last dose was: Your next dose is: Take 1 Tab by mouth nightly. 50 mg Discharge Instructions Colonoscopy Discharge Instructions Fareed Fang 
[id number] COLONOSCOPY FINDINGS: 
Your colonoscopy showed: 1. Mild diverticulosis of the sigmoid colon. 2. Otherwise normal examination. FOLLOW UP RECOMMENDATIONS: 
 Dr. Panchito Melvin recommends your next colonoscopy in 10 years. DISCOMFORT: 
If you have redness at your IV site- apply warm compress to area; if redness or soreness persist- contact your physician There may be a slight amount of blood passed from the rectum, more than a teaspoon of bright red blood is not expected - contact your physician Gaseous discomfort is common- walking, belching will help relieve any gas pains. If discomfort persist- contact your physician DIET: 
 High fiber diet. ACTIVITY: 
You may resume your normal daily activities, however, it is recommended that you spend the remainder of the day resting - avoiding any strenuous activities. You may not operate a vehicle for 24 hours You may not engage in an occupation involving machinery or appliances for rest of today You may not drink alcoholic beverages for at least 24 hours Avoid making any critical decisions for at least 24 hour CALL M.D. ANY SIGN OF: Increasing pain, nausea, vomiting Abdominal distension (swelling) New increased bleeding Fever or chills Pain in chest area or shortness of breath Nabor Pérez MD, FACS, FASCRS Colon and Rectal Surgery [facility] Office [phone] Fax     [phone] DISCHARGE SUMMARY from Nurse PATIENT INSTRUCTIONS: 
 
After general anesthesia or intravenous sedation, for 24 hours or while taking prescription Narcotics: · Limit your activities · Do not drive and operate hazardous machinery · Do not make important personal or business decisions · Do  not drink alcoholic beverages · If you have not urinated within 8 hours after discharge, please contact your surgeon on call. Report the following to your surgeon: 
· Excessive pain, swelling, redness or odor of or around the surgical area · Temperature over 100.5 · Nausea and vomiting lasting longer than 4 hours or if unable to take medications · Any signs of decreased circulation or nerve impairment to extremity: change in color, persistent  numbness, tingling, coldness or increase pain · Any questions What to do at Home: These are general instructions for a healthy lifestyle: No smoking/ No tobacco products/ Avoid exposure to second hand smoke Surgeon General's Warning:  Quitting smoking now greatly reduces serious risk to your health. Obesity, smoking, and sedentary lifestyle greatly increases your risk for illness A healthy diet, regular physical exercise & weight monitoring are important for maintaining a healthy lifestyle You may be retaining fluid if you have a history of heart failure or if you experience any of the following symptoms:  Weight gain of 3 pounds or more overnight or 5 pounds in a week, increased swelling in our hands or feet or shortness of breath while lying flat in bed. Please call your doctor as soon as you notice any of these symptoms; do not wait until your next office visit. Recognize signs and symptoms of STROKE: 
 
F-face looks uneven A-arms unable to move or move unevenly S-speech slurred or non-existent T-time-call 911 as soon as signs and symptoms begin-DO NOT go  
 Back to bed or wait to see if you get better-TIME IS BRAIN. Warning Signs of HEART ATTACK Call 911 if you have these symptoms: 
? Chest discomfort. Most heart attacks involve discomfort in the center of the chest that lasts more than a few minutes, or that goes away and comes back. It can feel like uncomfortable pressure, squeezing, fullness, or pain. ? Discomfort in other areas of the upper body. Symptoms can include pain or discomfort in one or both arms, the back, neck, jaw, or stomach. ? Shortness of breath with or without chest discomfort. ? Other signs may include breaking out in a cold sweat, nausea, or lightheadedness. Don't wait more than five minutes to call [address]! Fast action can save your life. Calling 911 is almost always the fastest way to get lifesaving treatment. Emergency Medical Services staff can begin treatment when they arrive  up to an hour sooner than if someone gets to the hospital by car. The discharge information has been reviewed with the patient. The patient verbalized understanding. Discharge medications reviewed with the patient and appropriate educational materials and side effects teaching were provided. ___________________________________________________________________________________________________________________________________ Patient armband removed and given to patient to take home. Patient was informed of the privacy risks if armband lost or stolen Introducing [facility]! [employer] introduces [employer] patient portal. Now you can access parts of your medical record, email your doctor's office, and request medication refills online. 1. In your internet browser, go to [email or web address] 2. Click on the First Time User? Click Here link in the Sign In box. You will see the New Member Sign Up page. 3. Enter your MyChart Access Code exactly as it appears below.  You will not need to use this code after youve completed the sign-up process. If you do not sign up before the expiration date, you must request a new code. · PA[employer] Streamline Access Code: [email or web address] Expires: 10/24/2018  5:37 PM 
 
4. Enter the last four digits of your Social Security Number (xxxx) and Date of Birth (mm/dd/yyyy) as indicated and click Submit. You will be taken to the next sign-up page. 5. Create a PAX Streamline ID. This will be your [employer] Streamline login ID and cannot be changed, so think of one that is secure and easy to remember. 6. Create a [employer] Stream[employer] password. You can change your password at any time. 7. Enter your Password Reset Question and Answer. This can be used at a later time if you forget your password. 8. Enter your e-mail address. You will receive e-mail notification when new information is available in [address] 9. Click Sign Up. You can now view and download portions of your medical record. 10. Click the Download Summary menu link to download a portable copy of your medical information. If you have questions, please visit the Frequently Asked Questions section of the [employer] Streamline website. Remember, [employer]line is NOT to be used for urgent needs. For medical emergencies, dial 911. Now available from your iPhone and Android! Introducing Tai Mohamud As a [facility] patient, I wanted to make you aware of our electronic visit tool called Tai Christopherediramone. [facility] 24/7 allows you to connect within minutes with a medical provider 24 hours a day, seven days a week via a mobile device or tablet or logging into a secure website from your computer. You can access Tai Mohamud from anywhere in the [place].  
 
A virtual visit might be right for you when you have a simple condition and feel like you just dont want to get out of bed, or cant get away from work for an appointment, when your regular [facility] provider is not available (evenings, weekends or holidays), or when youre out of town and need minor care. Electronic visits cost only $49 and if the [employer] 24/7 provider determines a prescription is needed to treat your condition, one can be electronically transmitted to a nearby pharmacy*. Please take a moment to enroll today if you have not already done so. The enrollment process is free and takes just a few minutes. To enroll, please download the [employer] 24/7 eitan to your tablet or phone, or visit [email or web address] to enroll on your computer. And, as an [address] [place] [address] patient with a [employer] account, the results of your visits will be scanned into your electronic medical record and your primary care provider will be able to view the scanned results. We urge you to continue to see your regular [employer] provider for your ongoing medical care. And while your primary care provider may not be the one available when you seek a [employer] virtual visit, the peace of mind you get from getting a real diagnosis real time can be priceless. For more information on [employer], view our Frequently Asked Questions (FAQs) at [email or web address]. Sincerely, 
 
Braeden Castro MD 
Chief Medical Officer [facility] Reynolds *:  certain medications cannot be prescribed via [employer] Providers Seen During Your Hospitalization Provider Specialty Primary office phone Rob Short MD Colon and Rectal Surgery [phone] Your Primary Care Physician (PCP) Primary Care Physician Office Phone Office Fax [facility] Blood [phone] You are allergic to the following No active allergies Recent Documentation Height Weight BMI Smoking Status 1.803 m 59.9 kg 18.41 kg/m2 Former Smoker Emergency Contacts Name Discharge Info Relation Home Work Mobile Sohail Sanchez DISCHARGE CAREGIVER [3] Other Relative [6] [phone] Patient Belongings The following personal items are in your possession at time of discharge: 
  Dental Appliances: None Please provide this summary of care documentation to your next provider. Signatures-by signing, you are acknowledging that this After Visit Summary has been reviewed with you and you have received a copy. Patient Signature:  ____________________________________________________________ Date:  ____________________________________________________________  
  
[facility] Provider Signature:  ____________________________________________________________ Date:  ____________________________________________________________

## 2018-07-26 NOTE — DISCHARGE INSTRUCTIONS
Colonoscopy Discharge Instructions       Raheem Garcia  [id number]  1955      COLONOSCOPY FINDINGS:  Your colonoscopy showed: 1. Mild diverticulosis of the sigmoid colon. 2. Otherwise normal examination. FOLLOW UP RECOMMENDATIONS:   Dr. Juan Jose Agarwal recommends your next colonoscopy in 10 years. DISCOMFORT:  If you have redness at your IV site- apply warm compress to area; if redness or soreness persist- contact your physician  There may be a slight amount of blood passed from the rectum, more than a teaspoon of bright red blood is not expected - contact your physician  Gaseous discomfort is common- walking, belching will help relieve any gas pains. If discomfort persist- contact your physician    DIET:   High fiber diet. ACTIVITY:  You may resume your normal daily activities, however, it is recommended that you spend the remainder of the day resting - avoiding any strenuous activities. You may not operate a vehicle for 24 hours  You may not engage in an occupation involving machinery or appliances for rest of today  You may not drink alcoholic beverages for at least 24 hours  Avoid making any critical decisions for at least 24 hour    CALL M.D. ANY SIGN OF:   Increasing pain, nausea, vomiting  Abdominal distension (swelling)  New increased bleeding   Fever or chills  Pain in chest area or shortness of breath      Renny Bach MD, FACS, FASCRS  Colon and Rectal Surgery  [facility]  Office [phone]  Fax     [phone] SUMMARY from Nurse    PATIENT INSTRUCTIONS:    After general anesthesia or intravenous sedation, for 24 hours or while taking prescription Narcotics:  · Limit your activities  · Do not drive and operate hazardous machinery  · Do not make important personal or business decisions  · Do  not drink alcoholic beverages  · If you have not urinated within 8 hours after discharge, please contact your surgeon on call.     Report the following to your surgeon:  · Excessive pain, swelling, redness or odor of or around the surgical area  · Temperature over 100.5  · Nausea and vomiting lasting longer than 4 hours or if unable to take medications  · Any signs of decreased circulation or nerve impairment to extremity: change in color, persistent  numbness, tingling, coldness or increase pain  · Any questions    What to do at Home:  These are general instructions for a healthy lifestyle:    No smoking/ No tobacco products/ Avoid exposure to second hand smoke  Surgeon General's Warning:  Quitting smoking now greatly reduces serious risk to your health. Obesity, smoking, and sedentary lifestyle greatly increases your risk for illness    A healthy diet, regular physical exercise & weight monitoring are important for maintaining a healthy lifestyle    You may be retaining fluid if you have a history of heart failure or if you experience any of the following symptoms:  Weight gain of 3 pounds or more overnight or 5 pounds in a week, increased swelling in our hands or feet or shortness of breath while lying flat in bed. Please call your doctor as soon as you notice any of these symptoms; do not wait until your next office visit. Recognize signs and symptoms of STROKE:    F-face looks uneven    A-arms unable to move or move unevenly    S-speech slurred or non-existent    T-time-call 911 as soon as signs and symptoms begin-DO NOT go       Back to bed or wait to see if you get better-TIME IS BRAIN. Warning Signs of HEART ATTACK     Call 911 if you have these symptoms:   Chest discomfort. Most heart attacks involve discomfort in the center of the chest that lasts more than a few minutes, or that goes away and comes back. It can feel like uncomfortable pressure, squeezing, fullness, or pain.  Discomfort in other areas of the upper body. Symptoms can include pain or discomfort in one or both arms, the back, neck, jaw, or stomach.    Shortness of breath with or without chest discomfort.  Other signs may include breaking out in a cold sweat, nausea, or lightheadedness. Don't wait more than five minutes to call 911 - MINUTES MATTER! Fast action can save your life. Calling 911 is almost always the fastest way to get lifesaving treatment. Emergency Medical Services staff can begin treatment when they arrive -- up to an hour sooner than if someone gets to the hospital by car. The discharge information has been reviewed with the patient. The patient verbalized understanding. Discharge medications reviewed with the patient and appropriate educational materials and side effects teaching were provided. ___________________________________________________________________________________________________________________________________    Patient armband removed and given to patient to take home.   Patient was informed of the privacy risks if armband lost or stolen

## 2018-07-26 NOTE — PROCEDURES
Colonoscopy Procedure Note      Akin Salazar  1955  [id number]                Date of Procedure: 7/26/2018    Indications:    Screening colonoscopy     Preoperative diagnosis: COLON CANCER SCREENING Z12.11      Postoperative diagnosis: diverticulosis    Title of Procedure:  Colonoscopy, screening    :  Sandra Rosales MD    Assistant(s): Endoscopy Technician-1: Caridad العلي  Endoscopy RN-1: Og Humphries RN    Referring Source:  Akhil Blair MD    Sedation:  Demerol 50 mg IV,  Versed 2 mg IV      ASA Class: ASA 3 - Severe systemic disease but compensated        Procedure Details:    Prior to the procedure, a history and physical were performed. The patients medications, allergies and sensitivities were reviewed and all questions were answered. After informed consent was obtained for the procedure, with all risks and benefits of procedure explained. The patient was taken to the endoscopy suite and placed in the left lateral decubitus position. Patient identification and proposed procedure were verified prior to the procedure by the nurse and I. Following the  satisfactory administration of sedation,  the anus was inspected and appeared within normal limits. Digital rectal examination revealed Normal sphincter tone and squeeze pressure. Palpation revealed No Masses. Next the Olympus video colonoscope was introduced through the anus and advanced to cecum, which was identified by the ileocecal valve and appendiceal orifice, terminal ileum. The quality of preparation was good. The terminal ileum was visualized. The colonoscope was then slowly withdrawn and the mucosa carefully examined for any abnormalities. Cecal withdrawl time was greater than six minutes. The patient tolerated the procedure well. Findings:   Rectum: normal  Sigmoid: mild diverticulosis;   Descending Colon: normal  Transverse Colon: normal  Ascending Colon: normal  Cecum: normal  Terminal Ileum: normal      Interventions:  none    Specimen Removed: * No specimens in log *     Complications: None. EBL:  None. Impression:    diverticulosis     Recommendations: -Repeat colonoscopy in 10 years   Resume normal medication(s). Discharge Disposition:  Home in the company of a  when able to ambulate.         Jess Avalos MD, FACS, [facility]  Colon and Rectal Surgery  [facility]  Office [phone]  Fax     [phone]  7/26/2018  5:14 PM       [facility]  [id number] [place] [address]

## 2018-11-27 DIAGNOSIS — E78.2 MIXED HYPERLIPIDEMIA: ICD-10-CM

## 2018-11-27 DIAGNOSIS — I10 ESSENTIAL HYPERTENSION: ICD-10-CM

## 2018-11-27 RX ORDER — LOSARTAN POTASSIUM 50 MG/1
TABLET ORAL
Qty: 90 TAB | Refills: 1 | Status: SHIPPED | OUTPATIENT
Start: 2018-11-27 | End: 2018-12-10 | Stop reason: SDUPTHER

## 2018-11-27 RX ORDER — ATORVASTATIN CALCIUM 40 MG/1
TABLET, FILM COATED ORAL
Qty: 90 TAB | Refills: 1 | Status: SHIPPED | OUTPATIENT
Start: 2018-11-27 | End: 2018-12-10 | Stop reason: SDUPTHER

## 2018-12-10 ENCOUNTER — OFFICE VISIT (OUTPATIENT)
Dept: FAMILY MEDICINE CLINIC | Age: 63
End: 2018-12-10

## 2018-12-10 ENCOUNTER — HOSPITAL ENCOUNTER (OUTPATIENT)
Dept: LAB | Age: 63
Discharge: HOME | End: 2018-12-10
Payer: MEDICARE

## 2018-12-10 VITALS
SYSTOLIC BLOOD PRESSURE: 126 MMHG | RESPIRATION RATE: 17 BRPM | HEIGHT: 71 IN | BODY MASS INDEX: 19.88 KG/M2 | OXYGEN SATURATION: 100 % | WEIGHT: 142 LBS | HEART RATE: 96 BPM | TEMPERATURE: 98 F | DIASTOLIC BLOOD PRESSURE: 85 MMHG

## 2018-12-10 DIAGNOSIS — Z23 ENCOUNTER FOR IMMUNIZATION: ICD-10-CM

## 2018-12-10 DIAGNOSIS — G47.00 INSOMNIA, UNSPECIFIED TYPE: ICD-10-CM

## 2018-12-10 DIAGNOSIS — J30.9 ALLERGIC RHINITIS, UNSPECIFIED SEASONALITY, UNSPECIFIED TRIGGER: ICD-10-CM

## 2018-12-10 DIAGNOSIS — Z11.59 NEED FOR HEPATITIS C SCREENING TEST: Primary | ICD-10-CM

## 2018-12-10 DIAGNOSIS — E78.2 MIXED HYPERLIPIDEMIA: ICD-10-CM

## 2018-12-10 DIAGNOSIS — I10 ESSENTIAL HYPERTENSION: ICD-10-CM

## 2018-12-10 DIAGNOSIS — Z11.59 NEED FOR HEPATITIS C SCREENING TEST: ICD-10-CM

## 2018-12-10 LAB
ALBUMIN SERPL-MCNC: 3.7 G/DL (ref 3.4–5)
ALBUMIN/GLOB SERPL: 0.8 {RATIO} (ref 0.8–1.7)
ALP SERPL-CCNC: 135 U/L (ref 45–117)
ALT SERPL-CCNC: 16 U/L (ref 16–61)
ANION GAP SERPL CALC-SCNC: 6 MMOL/L (ref 3–18)
AST SERPL-CCNC: 19 U/L (ref 15–37)
BILIRUB SERPL-MCNC: 0.3 MG/DL (ref 0.2–1)
BUN SERPL-MCNC: 15 MG/DL (ref 7–18)
BUN/CREAT SERPL: 16 (ref 12–20)
CALCIUM SERPL-MCNC: 8.5 MG/DL (ref 8.5–10.1)
CHLORIDE SERPL-SCNC: 101 MMOL/L (ref 100–108)
CHOLEST SERPL-MCNC: 126 MG/DL
CO2 SERPL-SCNC: 29 MMOL/L (ref 21–32)
CREAT SERPL-MCNC: 0.94 MG/DL (ref 0.6–1.3)
GLOBULIN SER CALC-MCNC: 4.4 G/DL (ref 2–4)
GLUCOSE SERPL-MCNC: 75 MG/DL (ref 74–99)
HDLC SERPL-MCNC: 42 MG/DL (ref 40–60)
HDLC SERPL: 3 {RATIO} (ref 0–5)
LDLC SERPL CALC-MCNC: 64.8 MG/DL (ref 0–100)
LIPID PROFILE,FLP: NORMAL
POTASSIUM SERPL-SCNC: 3.8 MMOL/L (ref 3.5–5.5)
PROT SERPL-MCNC: 8.1 G/DL (ref 6.4–8.2)
SODIUM SERPL-SCNC: 136 MMOL/L (ref 136–145)
TRIGL SERPL-MCNC: 96 MG/DL (ref ?–150)
VLDLC SERPL CALC-MCNC: 19.2 MG/DL

## 2018-12-10 PROCEDURE — 80053 COMPREHEN METABOLIC PANEL: CPT

## 2018-12-10 PROCEDURE — 80061 LIPID PANEL: CPT

## 2018-12-10 PROCEDURE — 36415 COLL VENOUS BLD VENIPUNCTURE: CPT

## 2018-12-10 PROCEDURE — 86803 HEPATITIS C AB TEST: CPT

## 2018-12-10 RX ORDER — MONTELUKAST SODIUM 10 MG/1
10 TABLET ORAL DAILY
Qty: 30 TAB | Refills: 2 | Status: SHIPPED | OUTPATIENT
Start: 2018-12-10 | End: 2020-03-19 | Stop reason: SDUPTHER

## 2018-12-10 RX ORDER — LOSARTAN POTASSIUM 50 MG/1
50 TABLET ORAL DAILY
Qty: 90 TAB | Refills: 1 | Status: SHIPPED | OUTPATIENT
Start: 2018-12-10 | End: 2020-02-10

## 2018-12-10 RX ORDER — ATORVASTATIN CALCIUM 40 MG/1
40 TABLET, FILM COATED ORAL DAILY
Qty: 90 TAB | Refills: 1 | Status: SHIPPED | OUTPATIENT
Start: 2018-12-10 | End: 2020-02-10

## 2018-12-10 RX ORDER — TRAZODONE HYDROCHLORIDE 50 MG/1
50 TABLET ORAL
Qty: 90 TAB | Refills: 0 | Status: SHIPPED | OUTPATIENT
Start: 2018-12-10 | End: 2020-03-19 | Stop reason: DRUGHIGH

## 2018-12-10 NOTE — PROGRESS NOTES
Brian Diallo Chief Complaint Patient presents with  Hypertension  Cholesterol Problem Vitals:  
 12/10/18 1520 BP: 126/85 Pulse: 96  
Resp: 17 Temp: 98 °F (36.7 °C) TempSrc: Oral  
SpO2: 100% Weight: 142 lb (64.4 kg) Height: 5' 11\" (1.803 m) PainSc:   0 - No pain HPI:*** Past Medical History:  
Diagnosis Date  [address]  Glaucoma  Hypercholesterolemia  Hypertension  Tremors of nervous system Past Surgical History:  
Procedure Laterality Date  COLONOSCOPY N/A 2018 COLONOSCOPY performed by Alexandre Grajeda MD at [facility] Social History Tobacco Use  Smoking status: Former Smoker Last attempt to quit: 2018 Years since quittin.9  Smokeless tobacco: Never Used Substance Use Topics  Alcohol use: No  
 
 
History reviewed. No pertinent family history. Review of Systems Constitutional: Negative for chills, fever, malaise/fatigue and weight loss. HENT: Positive for congestion and sore throat. Negative for ear discharge, ear pain, hearing loss and nosebleeds. Eyes: Negative for blurred vision, double vision and discharge. Respiratory: Positive for cough. Negative for hemoptysis, sputum production, shortness of breath and wheezing. Cardiovascular: Negative for chest pain, palpitations, claudication and leg swelling. Gastrointestinal: Negative for abdominal pain, constipation, diarrhea, nausea and vomiting. Genitourinary: Negative for dysuria, frequency and urgency. Musculoskeletal: Negative for back pain, joint pain, myalgias and neck pain. Skin: Negative for itching and rash. Neurological: Negative for dizziness, tingling, sensory change, speech change, focal weakness, weakness and headaches. Psychiatric/Behavioral: Negative for depression, hallucinations, substance abuse and suicidal ideas. The patient has insomnia. The patient is not nervous/anxious. Physical Exam  
Constitutional: He is oriented to person, place, and time. He appears well-developed and well-nourished. No distress. HENT:  
Head: Normocephalic and atraumatic. Mouth/Throat: No oropharyngeal exudate. Eyes: Conjunctivae are normal. Pupils are equal, round, and reactive to light. Right eye exhibits no discharge. Left eye exhibits no discharge. No scleral icterus. Neck: Normal range of motion. Neck supple. No thyromegaly present. Cardiovascular: Normal rate, regular rhythm and normal heart sounds. Pulmonary/Chest: Effort normal and breath sounds normal. No respiratory distress. He has no rales. Abdominal: Soft. Bowel sounds are normal. He exhibits no distension and no mass. There is no tenderness. There is no rebound. Musculoskeletal: Normal range of motion. He exhibits no edema, tenderness or deformity. Lymphadenopathy:  
  He has no cervical adenopathy. Neurological: He is alert and oriented to person, place, and time. No cranial nerve deficit. Coordination normal.  
Skin: Skin is warm and dry. No rash noted. He is not diaphoretic. No erythema. Psychiatric: He has a normal mood and affect. Judgment and thought content normal.  
Nursing note and vitals reviewed. Assessment and plan 1. Mixed hyperlipidemia 
 
- atorvastatin (LIPITOR) 40 mg tablet; Take 1 Tab by mouth daily. Dispense: 90 Tab; Refill: 1 
- LIPID PANEL; Future 2. Essential hypertension 
 
- losartan (COZAAR) 50 mg tablet; Take 1 Tab by mouth daily. Dispense: 90 Tab; Refill: 1 
- METABOLIC PANEL, COMPREHENSIVE; Future 3. Insomnia, unspecified type 
 
- traZODone (DESYREL) 50 mg tablet; Take 1 Tab by mouth nightly. Dispense: 90 Tab; Refill: 0 
 
4. Need for hepatitis C screening test 
 
- HEPATITIS C AB; Future Current Outpatient Medications Medication Sig Dispense Refill  atorvastatin (LIPITOR) 40 mg tablet Take 1 Tab by mouth daily.  90 Tab 1  
  losartan (COZAAR) 50 mg tablet Take 1 Tab by mouth daily. 90 Tab 1  
 traZODone (DESYREL) 50 mg tablet Take 1 Tab by mouth nightly. 90 Tab 0  
 diphenhydrAMINE (BENADRYL) 25 mg capsule Take 25 mg by mouth every six (6) hours as needed.  PEG 3350-Electrolytes (GO-LYTELY) 236-22.74-6.74 -5.86 gram suspension Take as directed 1 Bottle 0 Patient Active Problem List  
 Diagnosis Date Noted  Insomnia 04/25/2018  Mixed hyperlipidemia 04/25/2018  Legally blind 04/25/2018  Essential hypertension 04/25/2018 Results for orders placed or performed in visit on 04/25/18 CBC WITH AUTOMATED DIFF Result Value Ref Range WBC 6.3 4.0 - 11.0 K/uL  
 RBC 4.71 3.80 - 5.80 M/uL  
 HGB 14.0 13.1 - 17.2 g/dL HCT 42.7 39.3 - 51.6 % MCV 91 80 - 95 fL  
 MCH 30 26 - 34 pg MCHC 33 31 - 36 g/dL  
 RDW 14.8 10.0 - 15.5 % PLATELET 961 139 - 980 K/uL MPV 9.6 9.0 - 13.0 fL  
 NEUTROPHILS 47 40 - 75 % Lymphocytes 43 20 - 45 % MONOCYTES 8 3 - 12 % EOSINOPHILS 1 0 - 6 % BASOPHILS 1 0 - 2 %  
 ABS. NEUTROPHILS 3.0 1.8 - 7.7 K/uL ABSOLUTE LYMPHOCYTE COUNT 2.7 1.0 - 4.8 K/uL  
 ABS. MONOCYTES 0.5 0.1 - 1.0 K/uL  
 ABS. EOSINOPHILS 0.1 0.0 - 0.5 K/uL  
 ABS. BASOPHILS 0.0 0.0 - 0.2 K/uL LIPID PANEL Result Value Ref Range Triglyceride 152 (H) 40 - 149 mg/dL HDL Cholesterol 46 40 - 59 mg/dL Cholesterol, total 220 (H) 110 - 200 mg/dL CHOLESTEROL/HDL 4.8 0.0 - 5.0 LDL, calculated 143 (H) 50 - 99 mg/dL VLDL, calculated 30 8 - 30 mg/dL METABOLIC PANEL, COMPREHENSIVE Result Value Ref Range Glucose 78 70 - 99 mg/dL BUN 14 6 - 22 mg/dL Creatinine 0.9 0.8 - 1.6 mg/dL Sodium 140 133 - 145 mmol/L Potassium 4.5 3.5 - 5.5 mmol/L Chloride 97 (L) 98 - 110 mmol/L  
 CO2 26 20 - 32 mmol/L  
 AST (SGOT) 17 10 - 37 U/L  
 ALT (SGPT) 13 5 - 40 U/L Alk. phosphatase 105 40 - 125 U/L Bilirubin, total 0.4 0.2 - 1.2 mg/dL Calcium 9.3 8.4 - 10.4 mg/dL Protein, total 7.5 6.2 - 8.1 g/dL Albumin 4.4 3.5 - 5.0 g/dL A-G Ratio 1.4 1.1 - 2.6 ratio Globulin 3.1 2.0 - 4.0 g/dL Anion gap 17.0 mmol/L  
 GFRAA >60.0 >60.0 GFRNA >60.0 >60.0 AMB POC URINALYSIS DIP STICK AUTO W/ MICRO Result Value Ref Range Color (UA POC) [place] Clarity (UA POC) Clear Glucose (UA POC) Negative Negative Bilirubin (UA POC) Negative Negative Ketones (UA POC) Negative Negative Specific gravity (UA POC) 1.025 1.001 - 1.035 Blood (UA POC) Negative Negative pH (UA POC) 5.5 4.6 - 8.0 Protein (UA POC) Negative Negative Urobilinogen (UA POC) 0.2 mg/dL 0.2 - 1 Nitrites (UA POC) Negative Negative Leukocyte esterase (UA POC) Negative Negative No visits with results within 3 Month(s) from this visit. Latest known visit with results is:  
Office Visit on 04/25/2018 Component Date Value Ref Range Status  Color (UA POC) 04/25/2018 [place]   Final  
 Clarity (UA POC) 04/25/2018 Clear   Final  
 Glucose (UA POC) 04/25/2018 Negative  Negative Final  
 Bilirubin (UA POC) 04/25/2018 Negative  Negative Final  
 Ketones (UA POC) 04/25/2018 Negative  Negative Final  
 Specific gravity (UA POC) 04/25/2018 1.025  1.001 - 1.035 Final  
 Blood (UA POC) 04/25/2018 Negative  Negative Final  
 pH (UA POC) 04/25/2018 5.5  4.6 - 8.0 Final  
 Protein (UA POC) 04/25/2018 Negative  Negative Final  
 Urobilinogen (UA POC) 04/25/2018 0.2 mg/dL  0.2 - 1 Final  
 Nitrites (UA POC) 04/25/2018 Negative  Negative Final  
 Leukocyte esterase (UA POC) 04/25/2018 Negative  Negative Final  
 WBC 04/25/2018 6.3  4.0 - 11.0 K/uL Final  
 RBC 04/25/2018 4.71  3.80 - 5.80 M/uL Final  
 HGB 04/25/2018 14.0  13.1 - 17.2 g/dL Final  
 HCT 04/25/2018 42.7  39.3 - 51.6 % Final  
 MCV 04/25/2018 91  80 - 95 fL Final  
 MCH 04/25/2018 30  26 - 34 pg Final  
 MCHC 04/25/2018 33  31 - 36 g/dL Final  
 RDW 04/25/2018 14.8  10.0 - 15.5 % Final  
  PLATELET 75/10/7593 133  140 - 440 K/uL Final  
 MPV 04/25/2018 9.6  9.0 - 13.0 fL Final  
 NEUTROPHILS 04/25/2018 47  40 - 75 % Final  
 Lymphocytes 04/25/2018 43  20 - 45 % Final  
 MONOCYTES 04/25/2018 8  3 - 12 % Final  
 EOSINOPHILS 04/25/2018 1  0 - 6 % Final  
 BASOPHILS 04/25/2018 1  0 - 2 % Final  
 ABS. NEUTROPHILS 04/25/2018 3.0  1.8 - 7.7 K/uL Final  
 ABSOLUTE LYMPHOCYTE COUNT 04/25/2018 2.7  1.0 - 4.8 K/uL Final  
 ABS. MONOCYTES 04/25/2018 0.5  0.1 - 1.0 K/uL Final  
 ABS. EOSINOPHILS 04/25/2018 0.1  0.0 - 0.5 K/uL Final  
 ABS. BASOPHILS 04/25/2018 0.0  0.0 - 0.2 K/uL Final  
 Triglyceride 04/25/2018 152* 40 - 149 mg/dL Final  
 HDL Cholesterol 04/25/2018 46  40 - 59 mg/dL Final  
 Cholesterol, total 04/25/2018 220* 110 - 200 mg/dL Final  
 CHOLESTEROL/HDL 04/25/2018 4.8  0.0 - 5.0 Final  
 LDL, calculated 04/25/2018 143* 50 - 99 mg/dL Final  
 VLDL, calculated 04/25/2018 30  8 - 30 mg/dL Final  
 Comment: Test includes cholesterol, HDL cholesterol, triglycerides and LDL. Cholesterol Recommended NCEP guidelines in mg/dL: 
Less than 200            Desirable 200 - 239                Borderline High Greater than or  = 240   High Please Note: Total Chol/HDL Ratio Men     Women 1/2 Avg. Risk    3.4     3.3 Avg. Risk    5.0     4.4 
2X  Avg. Risk    9.6     7.1 3X  Avg. Risk   23.4    11.0  Glucose 04/25/2018 78  70 - 99 mg/dL Final  
 BUN 04/25/2018 14  6 - 22 mg/dL Final  
 Creatinine 04/25/2018 0.9  0.8 - 1.6 mg/dL Final  
 Sodium 04/25/2018 140  133 - 145 mmol/L Final  
 Potassium 04/25/2018 4.5  3.5 - 5.5 mmol/L Final  
 Chloride 04/25/2018 97* 98 - 110 mmol/L Final  
 CO2 04/25/2018 26  20 - 32 mmol/L Final  
 AST (SGOT) 04/25/2018 17  10 - 37 U/L Final  
 ALT (SGPT) 04/25/2018 13  5 - 40 U/L Final  
 Alk.  phosphatase 04/25/2018 105  40 - 125 U/L Final  
 Bilirubin, total 04/25/2018 0.4  0.2 - 1.2 mg/dL Final  
  Calcium 04/25/2018 9.3  8.4 - 10.4 mg/dL Final  
 Protein, total 04/25/2018 7.5  6.2 - 8.1 g/dL Final  
 Albumin 04/25/2018 4.4  3.5 - 5.0 g/dL Final  
 A-G Ratio 04/25/2018 1.4  1.1 - 2.6 ratio Final  
 Globulin 04/25/2018 3.1  2.0 - 4.0 g/dL Final  
 Anion gap 04/25/2018 17.0  mmol/L Final  
 Comment: Test includes Albumin, Alkaline Phosphatase, ALT, AST, BUN, Calcium, CO2, Chloride, Creatinine, Glucose, Potassium, Sodium, Total Bilirubin and Total 
Protein. Estimated GFR results are reported in mL/min/1.73 sq.m. by the MDRD equation. This eGFR is validated for stable chronic renal failure patients. This  
equation 
is unreliable in acute illness or patients with normal renal function.  GFRAA 04/25/2018 >60.0  >60.0 Final  
 GFRNA 04/25/2018 >60.0  >60.0 Final  
  
 
 
Follow-up Disposition: Not on File

## 2018-12-11 LAB
HCV AB SER IA-ACNC: 0.07 INDEX
HCV AB SERPL QL IA: NEGATIVE
HCV COMMENT,HCGAC: NORMAL

## 2018-12-12 NOTE — PROGRESS NOTES
Hep C screening is negative, the patient cholesterol level has much improved continue same treatment.

## 2018-12-12 NOTE — PROGRESS NOTES
Romelia Edis     Chief Complaint   Patient presents with    Hypertension    Cholesterol Problem    Immunization/Injection     influenza     Vitals:    12/10/18 1520   BP: 126/85   Pulse: 96   Resp: 17   Temp: 98 °F (36.7 °C)   TempSrc: Oral   SpO2: 100%   Weight: 142 lb (64.4 kg)   Height: 5' 11\" (1.803 m)   PainSc:   0 - No pain         HPI: Mr. Anneliese Ugarte is here today for follow-up for hypertension which has been well controlled on losartan 50 mg daily patient has been adherent to his medications, he also has hyperlipidemia he is on Lipitor 40 mg daily lipid panel will be repeated today before follow-up. He has chronic insomnia and he used trazodone daily. Patient has been complaining up with nasal congestion runny nose for the last few month with postnasal drip, use any of the over-the-counter medication, he has no cough no nausea no vomiting no chest pain no shortness of breath no fever or chills. He cannot link nasal congestion and a runny nose stool specific allergen pets at home and he does not go outside much. Patient is legally blind for 22 years and he is here today with his cousin who is his power of     Past Medical History:   Diagnosis Date    Blindness 58    Glaucoma     Hypercholesterolemia     Hypertension     Tremors of nervous system      Past Surgical History:   Procedure Laterality Date    COLONOSCOPY N/A 2018    COL[facility]OSCOPY performed by Brian Bah MD at [facility]COPY    HX HIP REPLACEMENT       Social History     Tobacco Use    Smoking status: Former Smoker     Last attempt to quit: 2018     Years since quittin.9    Smokeless tobacco: Never Used   Substance Use Topics    Alcohol use: No       History reviewed. No pertinent family history. Review of Systems   Constitutional: Negative for chills, fever, malaise/fatigue and weight loss. HENT: Positive for congestion and sore throat. Negative for ear discharge, ear pain, hearing loss and nosebleeds. Eyes: Negative for blurred vision, double vision and discharge. Respiratory: Positive for cough. Negative for hemoptysis, sputum production, shortness of breath and wheezing. Cardiovascular: Negative for chest pain, palpitations, claudication and leg swelling. Gastrointestinal: Negative for abdominal pain, constipation, diarrhea, nausea and vomiting. Genitourinary: Negative for dysuria, frequency and urgency. Musculoskeletal: Negative for back pain, joint pain, myalgias and neck pain. Skin: Negative for itching and rash. Neurological: Negative for dizziness, tingling, sensory change, speech change, focal weakness, weakness and headaches. Psychiatric/Behavioral: Negative for depression, hallucinations, substance abuse and suicidal ideas. The patient has insomnia. The patient is not nervous/anxious. Physical Exam   Constitutional: He is oriented to person, place, and time. He appears well-developed and well-nourished. No distress. HENT:   Head: Normocephalic and atraumatic. Mouth/Throat: No oropharyngeal exudate. Eyes: Conjunctivae are normal. Pupils are equal, round, and reactive to light. Right eye exhibits no discharge. Left eye exhibits no discharge. No scleral icterus. Neck: Normal range of motion. Neck supple. No thyromegaly present. Cardiovascular: Normal rate, regular rhythm and normal heart sounds. Pulmonary/Chest: Effort normal and breath sounds normal. No respiratory distress. He has no rales. Abdominal: Soft. Bowel sounds are normal. He exhibits no distension and no mass. There is no tenderness. There is no rebound. Musculoskeletal: Normal range of motion. He exhibits no edema, tenderness or deformity. Lymphadenopathy:     He has no cervical adenopathy. Neurological: He is alert and oriented to person, place, and time. No cranial nerve deficit. Coordination normal.   Skin: Skin is warm and dry. No rash noted. He is not diaphoretic. No erythema.    Psychiatric: He has a normal mood and affect. Judgment and thought content normal.   Nursing note and vitals reviewed. Assessment and plan     1. Mixed hyperlipidemia  To Lipitor 40 and check lipid profile today    - atorvastatin (LIPITOR) 40 mg tablet; Take 1 Tab by mouth daily. Dispense: 90 Tab; Refill: 1  - LIPID PANEL; Future    2. Essential hypertension  Well-controlled continue same medications  - losartan (COZAAR) 50 mg tablet; Take 1 Tab by mouth daily. Dispense: 90 Tab; Refill: 1  - METABOLIC PANEL, COMPREHENSIVE; Future    3. Insomnia, unspecified type    - traZODone (DESYREL) 50 mg tablet; Take 1 Tab by mouth nightly. Dispense: 90 Tab; Refill: 0    4. Need for hepatitis C screening test    - HEPATITIS C AB; Future    Allergic rhinitis patient will be started on singular 1 tablet daily. Current Outpatient Medications   Medication Sig Dispense Refill    atorvastatin (LIPITOR) 40 mg tablet Take 1 Tab by mouth daily. 90 Tab 1    losartan (COZAAR) 50 mg tablet Take 1 Tab by mouth daily. 90 Tab 1    traZODone (DESYREL) 50 mg tablet Take 1 Tab by mouth nightly. 90 Tab 0    montelukast (SINGULAIR) 10 mg tablet Take 1 Tab by mouth daily. 30 Tab 2    diphenhydrAMINE (BENADRYL) 25 mg capsule Take 25 mg by mouth every six (6) hours as needed.       PEG 3350-Electrolytes (GO-LYTELY) 236-22.74-6.74 -5.86 gram suspension Take as directed 1 Bottle 0       Patient Active Problem List    Diagnosis Date Noted    Insomnia 04/25/2018    Mixed hyperlipidemia 04/25/2018    Legally blind 04/25/2018    Essential hypertension 04/25/2018     Results for orders placed or performed in visit on 04/25/18   CBC WITH AUTOMATED DIFF   Result Value Ref Range    WBC 6.3 4.0 - 11.0 K/uL    RBC 4.71 3.80 - 5.80 M/uL    HGB 14.0 13.1 - 17.2 g/dL    HCT 42.7 39.3 - 51.6 %    MCV 91 80 - 95 fL    MCH 30 26 - 34 pg    MCHC 33 31 - 36 g/dL    RDW 14.8 10.0 - 15.5 %    PLATELET 248 585 - 991 K/uL    MPV 9.6 9.0 - 13.0 fL    NEUTROPHILS 47 40 - 75 %    Lymphocytes 43 20 - 45 %    MONOCYTES 8 3 - 12 %    EOSINOPHILS 1 0 - 6 %    BASOPHILS 1 0 - 2 %    ABS. NEUTROPHILS 3.0 1.8 - 7.7 K/uL    ABSOLUTE LYMPHOCYTE COUNT 2.7 1.0 - 4.8 K/uL    ABS. MONOCYTES 0.5 0.1 - 1.0 K/uL    ABS. EOSINOPHILS 0.1 0.0 - 0.5 K/uL    ABS. BASOPHILS 0.0 0.0 - 0.2 K/uL   LIPID PANEL   Result Value Ref Range    Triglyceride 152 (H) 40 - 149 mg/dL    HDL Cholesterol 46 40 - 59 mg/dL    Cholesterol, total 220 (H) 110 - 200 mg/dL    CHOLESTEROL/HDL 4.8 0.0 - 5.0    LDL, calculated 143 (H) 50 - 99 mg/dL    VLDL, calculated 30 8 - 30 mg/dL   METABOLIC PANEL, COMPREHENSIVE   Result Value Ref Range    Glucose 78 70 - 99 mg/dL    BUN 14 6 - 22 mg/dL    Creatinine 0.9 0.8 - 1.6 mg/dL    Sodium 140 133 - 145 mmol/L    Potassium 4.5 3.5 - 5.5 mmol/L    Chloride 97 (L) 98 - 110 mmol/L    CO2 26 20 - 32 mmol/L    AST (SGOT) 17 10 - 37 U/L    ALT (SGPT) 13 5 - 40 U/L    Alk.  phosphatase 105 40 - 125 U/L    Bilirubin, total 0.4 0.2 - 1.2 mg/dL    Calcium 9.3 8.4 - 10.4 mg/dL    Protein, total 7.5 6.2 - 8.1 g/dL    Albumin 4.4 3.5 - 5.0 g/dL    A-G Ratio 1.4 1.1 - 2.6 ratio    Globulin 3.1 2.0 - 4.0 g/dL    Anion gap 17.0 mmol/L    GFRAA >60.0 >60.0    GFRNA >60.0 >60.0   AMB POC URINALYSIS DIP STICK AUTO W/ MICRO   Result Value Ref Range    Color (UA POC) Orange     Clarity (UA POC) Clear     Glucose (UA POC) Negative Negative    Bilirubin (UA POC) Negative Negative    Ketones (UA POC) Negative Negative    Specific gravity (UA POC) 1.025 1.001 - 1.035    Blood (UA POC) Negative Negative    pH (UA POC) 5.5 4.6 - 8.0    Protein (UA POC) Negative Negative    Urobilinogen (UA POC) 0.2 mg/dL 0.2 - 1    Nitrites (UA POC) Negative Negative    Leukocyte esterase (UA POC) Negative Negative     Hospital Outpatient Visit on 12/10/2018   Component Date Value Ref Range Status    Sodium 12/10/2018 136  136 - 145 mmol/L Final    Potassium 12/10/2018 3.8  3.5 - 5.5 mmol/L Final    Chloride 12/10/2018 101 100 - 108 mmol/L Final    CO2 12/10/2018 29  21 - 32 mmol/L Final    Anion gap 12/10/2018 6  3.0 - 18 mmol/L Final    Glucose 12/10/2018 75  74 - 99 mg/dL Final    BUN 12/10/2018 15  7.0 - 18 MG/DL Final    Creatinine 12/10/2018 0.94  0.6 - 1.3 MG/DL Final    BUN/Creatinine ratio 12/10/2018 16  12 - 20   Final    GFR est AA 12/10/2018 >60  >60 ml/min/1.73m2 Final    GFR est non-AA 12/10/2018 >60  >60 ml/min/1.73m2 Final    Comment: (NOTE)  Estimated GFR is calculated using the Modification of Diet in Renal   Disease (MDRD) Study equation, reported for both  Americans   (GFRAA) and non- Americans (GFRNA), and normalized to 1.73m2   body surface area. The physician must decide which value applies to   the patient. The MDRD study equation should only be used in   individuals age 25 or older. It has not been validated for the   following: pregnant women, patients with serious comorbid conditions,   or on certain medications, or persons with extremes of body size,   muscle mass, or nutritional status.  Calcium 12/10/2018 8.5  8.5 - 10.1 MG/DL Final    Bilirubin, total 12/10/2018 0.3  0.2 - 1.0 MG/DL Final    ALT (SGPT) 12/10/2018 16  16 - 61 U/L Final    AST (SGOT) 12/10/2018 19  15 - 37 U/L Final    Alk. phosphatase 12/10/2018 135* 45 - 117 U/L Final    Protein, total 12/10/2018 8.1  6.4 - 8.2 g/dL Final    Albumin 12/10/2018 3.7  3.4 - 5.0 g/dL Final    Globulin 12/10/2018 4.4* 2.0 - 4.0 g/dL Final    A-G Ratio 12/10/2018 0.8  0.8 - 1.7   Final    LIPID PROFILE 12/10/2018        Final    Cholesterol, total 12/10/2018 126  <200 MG/DL Final    Triglyceride 12/10/2018 96  <150 MG/DL Final    Comment: The drugs N-acetylcysteine (NAC) and  Metamiszole have been found to cause falsely  low results in this chemical assay. Please  be sure to submit blood samples obtained  BEFORE administration of either of these  drugs to assure correct results.       HDL Cholesterol 12/10/2018 42  40 - 60 MG/DL Final    LDL, calculated 12/10/2018 64.8  0 - 100 MG/DL Final    VLDL, calculated 12/10/2018 19.2  MG/DL Final    CHOL/HDL Ratio 12/10/2018 3.0  0 - 5.0   Final    Hepatitis C virus Ab 12/10/2018 0.07  <0.80 Index Final    Hep C  virus Ab Interp. 12/10/2018 NEGATIVE   NEG   Final    Hep C  virus Ab comment 12/10/2018        Final    Comment: Index <0.80. ......................... Cedric Ivey Negative  Index > or = to 0.80 and <1.00. .... Cedric Ivey Equivocal  Index >1.00. ......................... Cedric Ivey Positive          For Equivocal or Positive results, confirmation with Hepatitis C RNA by PCR or bDNA is suggested. Follow-up Disposition:  Return in about 3 months (around 3/10/2019) for as per results [id number] [facility] [address] .

## 2020-02-08 DIAGNOSIS — I10 ESSENTIAL HYPERTENSION: ICD-10-CM

## 2020-02-08 DIAGNOSIS — E78.2 MIXED HYPERLIPIDEMIA: ICD-10-CM

## 2020-02-10 RX ORDER — LOSARTAN POTASSIUM 50 MG/1
TABLET ORAL
Qty: 90 TAB | Refills: 0 | Status: SHIPPED | OUTPATIENT
Start: 2020-02-10 | End: 2020-03-19 | Stop reason: SDUPTHER

## 2020-02-10 RX ORDER — ATORVASTATIN CALCIUM 40 MG/1
TABLET, FILM COATED ORAL
Qty: 90 TAB | Refills: 0 | Status: SHIPPED | OUTPATIENT
Start: 2020-02-10 | End: 2020-03-19 | Stop reason: SDUPTHER

## 2020-02-10 NOTE — TELEPHONE ENCOUNTER
No answer and VM box not set up. Need to notify patient patient needs f/u appt before any further refills. Please schedule if patient returns call.

## 2020-03-19 ENCOUNTER — OFFICE VISIT (OUTPATIENT)
Dept: FAMILY MEDICINE CLINIC | Age: 65
End: 2020-03-19

## 2020-03-19 VITALS
OXYGEN SATURATION: 99 % | WEIGHT: 129 LBS | TEMPERATURE: 98.5 F | DIASTOLIC BLOOD PRESSURE: 62 MMHG | SYSTOLIC BLOOD PRESSURE: 95 MMHG | HEART RATE: 112 BPM | HEIGHT: 71 IN | RESPIRATION RATE: 20 BRPM | BODY MASS INDEX: 18.06 KG/M2

## 2020-03-19 DIAGNOSIS — Z12.5 PROSTATE CANCER SCREENING: ICD-10-CM

## 2020-03-19 DIAGNOSIS — I10 ESSENTIAL HYPERTENSION: ICD-10-CM

## 2020-03-19 DIAGNOSIS — H54.8 LEGALLY BLIND: ICD-10-CM

## 2020-03-19 DIAGNOSIS — G47.00 INSOMNIA, UNSPECIFIED TYPE: ICD-10-CM

## 2020-03-19 DIAGNOSIS — Z00.00 MEDICARE ANNUAL WELLNESS VISIT, SUBSEQUENT: Primary | ICD-10-CM

## 2020-03-19 DIAGNOSIS — J30.9 ALLERGIC RHINITIS, UNSPECIFIED SEASONALITY, UNSPECIFIED TRIGGER: ICD-10-CM

## 2020-03-19 DIAGNOSIS — E78.2 MIXED HYPERLIPIDEMIA: ICD-10-CM

## 2020-03-19 DIAGNOSIS — E55.9 VITAMIN D DEFICIENCY: ICD-10-CM

## 2020-03-19 DIAGNOSIS — Z12.11 COLON CANCER SCREENING: ICD-10-CM

## 2020-03-19 RX ORDER — MONTELUKAST SODIUM 10 MG/1
10 TABLET ORAL DAILY
Qty: 90 TAB | Refills: 0 | Status: SHIPPED | OUTPATIENT
Start: 2020-03-19 | End: 2020-06-30 | Stop reason: SDUPTHER

## 2020-03-19 RX ORDER — LOSARTAN POTASSIUM 25 MG/1
25 TABLET ORAL DAILY
Qty: 90 TAB | Refills: 1 | Status: SHIPPED | OUTPATIENT
Start: 2020-03-19 | End: 2020-09-20

## 2020-03-19 RX ORDER — ATORVASTATIN CALCIUM 40 MG/1
40 TABLET, FILM COATED ORAL DAILY
Qty: 90 TAB | Refills: 1 | Status: SHIPPED | OUTPATIENT
Start: 2020-03-19 | End: 2021-01-25

## 2020-03-19 RX ORDER — TRAZODONE HYDROCHLORIDE 100 MG/1
100 TABLET ORAL
Qty: 90 TAB | Refills: 0 | Status: SHIPPED | OUTPATIENT
Start: 2020-03-19 | End: 2020-06-30 | Stop reason: SDUPTHER

## 2020-03-19 RX ORDER — LOSARTAN POTASSIUM 50 MG/1
50 TABLET ORAL DAILY
Qty: 90 TAB | Refills: 1 | Status: SHIPPED | OUTPATIENT
Start: 2020-03-19 | End: 2020-03-19 | Stop reason: DRUGHIGH

## 2020-03-19 NOTE — PROGRESS NOTES
(AWV) The Initial Medicare Annual Wellness Exam PROGRESS NOTE    This is an Initial Medicare Annual Wellness Exam (AWV) (Performed 12 months after IPPE or effective date of Medicare Part B enrollment, Once in a lifetime)    I have reviewed the patient's medical history in detail and updated the computerized patient record. Dagoberto Flaherty is a 60 y.o.  male and presents for an annual wellness exam       Patient Active Problem List   Diagnosis Code    Insomnia G47.00    Mixed hyperlipidemia E78.2    Legally blind H54.8    Essential hypertension I10     Patient Active Problem List    Diagnosis Date Noted    Insomnia 2018    Mixed hyperlipidemia 2018    Legally blind 2018    Essential hypertension 2018     Current Outpatient Medications   Medication Sig Dispense Refill    montelukast (SINGULAIR) 10 mg tablet Take 1 Tab by mouth daily. 90 Tab 0    traZODone (DESYREL) 100 mg tablet Take 1 Tab by mouth nightly for 90 days. 90 Tab 0    atorvastatin (LIPITOR) 40 mg tablet Take 1 Tab by mouth daily. 90 Tab 1    losartan (COZAAR) 25 mg tablet Take 1 Tab by mouth daily. 90 Tab 1    diphenhydrAMINE (BENADRYL) 25 mg capsule Take 25 mg by mouth every six (6) hours as needed. No Known Allergies  Past Medical History:   Diagnosis Date    Blindness     Glaucoma     Hypercholesterolemia     Hypertension     Tremors of nervous system      Past Surgical History:   Procedure Laterality Date    COLONOSCOPY N/A 2018    COLONOSCOPY performed by Lencho Pavon MD at [facility] ENDOSCOPY    HX HIP REPLACEMENT       No family history on file.   Social History     Tobacco Use    Smoking status: Former Smoker     Last attempt to quit: 2018     Years since quittin.2    Smokeless tobacco: Never Used   Substance Use Topics    Alcohol use: No       ROS     History obtained from the patient AND HIS COUSIN   General ROS: negative for - chills, fatigue or fever  Psychological ROS: negative for - anxiety or depression  Ophthalmic ROS: Legally blind   ENT ROS: positive for - nasal congestion, nasal discharge and sneezing  Allergy and Immunology ROS: allergic rhinits  Hematological and Lymphatic ROS: negative for - bleeding problems or bruising  Endocrine ROS: negative for - skin changes or unexpected weight changes  Respiratory ROS: positive for - Dry cough  Cardiovascular ROS: no chest pain or dyspnea on exertion  Gastrointestinal ROS: no abdominal pain, change in bowel habits, or black or bloody stools  Genito-Urinary ROS: no dysuria, trouble voiding, or hematuria  Musculoskeletal ROS: negative  negative for - gait disturbance or joint pain  Neurological ROS: no TIA or stroke symptoms  Dermatological ROS: negative for - dry skin      History     Past Medical History:   Diagnosis Date    Blindness 58    Glaucoma     Hypercholesterolemia     Hypertension     Tremors of nervous system       Past Surgical History:   Procedure Laterality Date    COLONOSCOPY N/A 2018    COLON[facility]COPY performed by Nancy Holliday [facility] at [facility] END[facility]COPY    HX HIP REPLACEMENT       Current Outpatient Medications   Medication Sig Dispense Refill    montelukast (SINGULAIR) 10 mg tablet Take 1 Tab by mouth daily. 90 Tab 0    traZODone (DESYREL) 100 mg tablet Take 1 Tab by mouth nightly for 90 days. 90 Tab 0    atorvastatin (LIPITOR) 40 mg tablet Take 1 Tab by mouth daily. 90 Tab 1    losartan (COZAAR) 25 mg tablet Take 1 Tab by mouth daily. 90 Tab 1    diphenhydrAMINE (BENADRYL) 25 mg capsule Take 25 mg by mouth every six (6) hours as needed. No Known Allergies  No family history on file.   Social History     Tobacco Use    Smoking status: Former Smoker     Last attempt to quit: 2018     Years since quittin.2    Smokeless tobacco: Never Used   Substance Use Topics    Alcohol use: No     Patient Active Problem List   Diagnosis Code    Insomnia G47.00    Mixed hyperlipidemia E78.2    Legally blind H54.8    Essential hypertension I10       Health Maintenance History  Immunizations reviewed,  He and was advised  Get the shingle vaccine at the pharmacy also explained to his cousin     colonoscopy: was not done referred in 2018 to order [email or web address] today   Chest CT: Quit smoking 3 years ago   Eye exam: legally blind             Depression Risk Factor Screening:      Patient Health Questionnaire (PHQ-2)   Over the last 2 weeks, how often have you been bothered by any of the following problems? · Little interest or pleasure in doing things? · Not at all. [0]  · Feeling down, depressed, or hopeless? · Not at all. [0]    Total Score: 0/6  PHQ-2 Assessment Scoring:   A score of 2 or more requires further screening with the PHQ-9    Alcohol Risk Factor Screening:     Women: On any occasion during the past 3 months, have you had more than 3 drinks containing alcohol? Do you average more than 7 drinks per week? Men: On any occasion during the past 3 months, have you had more than 4 drinks containing alcohol? No   Do you average more than 14 drinks per week? No    Functional Ability and Level of Safety:     Hearing Loss    Hearing is good. Activities of Daily Living   Partial assistance. Requires assistance with: ambulation and no ADLs    Fall Risk   Impaired (20 pts)    Abuse Screen   Patient is not abused  None    Examination   Physical Examination  Vitals:    03/19/20 1458   BP: 95/62   Pulse: (!) 112   Resp: 20   Temp: 98.5 °F (36.9 °C)   TempSrc: Oral   SpO2: 99%   Weight: 129 lb (58.5 kg)   Height: 5' 11\" (1.803 m)   PainSc:   0 - No pain      Body mass index is 17.99 kg/m².      Evaluation of Cognitive Function:  Mood/affect: normal   Appearance:well groomed   Family member/caregiver input: Need blind stick     alert, well appearing, and in no distress    Patient Care Team:  Mena Ortiz MD as PCP - General (Internal Medicine)  Mena Ortiz MD as PCP - [facility] Empaneled Provider    End-of-life planning  Advanced Directive in the case than an injury or illness causes the patient to be unable to make health care decisions    Health Care Directive or Living Will: no patient cousin was handed the advanced directive to be completed and bring it back     Advice/Referrals/Counselling/Plan:   Education and counseling provided:  Are appropriate based on today's review and evaluation  Pneumococcal Vaccine  Influenza Vaccine  Colorectal cancer screening tests  Diabetes screening test  Include in education list (weight loss, physical activity, smoking cessation, fall prevention, and nutrition)    ICD-10-CM ICD-9-CM    1. Medicare annual wellness visit, subsequent Z00.00 V70.0    2. Allergic rhinitis, unspecified seasonality, unspecified trigger J30.9 477.9 montelukast (SINGULAIR) 10 mg tablet   3. Mixed hyperlipidemia E78.2 272.2 LIPID PANEL      atorvastatin (LIPITOR) 40 mg tablet   4. Essential hypertension I10 401.9 LIPID PANEL      losartan (COZAAR) 25 mg tablet      DISCONTINUED: losartan (COZAAR) 50 mg tablet   5. Insomnia, unspecified type G47.00 780.52 traZODone (DESYREL) 100 mg tablet   6. Legally blind H54.8 369.4 AMB SUPPLY ORDER   7. Colon cancer screening Z12.11 V76.51 COLOGUARD TEST (FECAL DNA COLORECTAL CANCER SCREENING)   8. Vitamin D deficiency E55.9 268.9 VITAMIN D, 25 HYDROXY     Encounter Diagnoses   Name Primary?     Medicare annual wellness visit, subsequent Yes    Allergic rhinitis, unspecified seasonality, unspecified trigger     Mixed hyperlipidemia     Essential hypertension     Insomnia, unspecified type     Legally blind     Colon cancer screening     Vitamin D deficiency      Orders Placed This Encounter    AMB SUPPLY ORDER    LIPID PANEL    COLOGUARD TEST (FECAL DNA COLORECTAL CANCER SCREENING)    VITAMIN D, 25 HYDROXY    montelukast (SINGULAIR) 10 mg tablet    traZODone (DESYREL) 100 mg tablet    atorvastatin (LIPITOR) 40 mg tablet  DISCONTD: losartan (COZAAR) 50 mg tablet    losartan (COZAAR) 25 mg tablet     Orders Placed This Encounter    AMB SUPPLY ORDER     Blind Stick for walking he is legally blind    LIPID PANEL     Standing Status:   Future     Standing Expiration Date:   3/20/2021    COLOGUARD TEST (FECAL DNA COLORECTAL CANCER SCREENING)    VITAMIN D, 25 HYDROXY     Standing Status:   Future     Standing Expiration Date:   3/20/2021    montelukast (SINGULAIR) 10 mg tablet     Sig: Take 1 Tab by mouth daily. Dispense:  90 Tab     Refill:  0    traZODone (DESYREL) 100 mg tablet     Sig: Take 1 Tab by mouth nightly for 90 days. Dispense:  90 Tab     Refill:  0    atorvastatin (LIPITOR) 40 mg tablet     Sig: Take 1 Tab by mouth daily. Dispense:  90 Tab     Refill:  1    DISCONTD: losartan (COZAAR) 50 mg tablet     Sig: Take 1 Tab by mouth daily. Dispense:  90 Tab     Refill:  1    losartan (COZAAR) 25 mg tablet     Sig: Take 1 Tab by mouth daily. Dispense:  90 Tab     Refill:  1     Orders Placed This Encounter    AMB SUPPLY ORDER    LIPID PANEL    COLOGUARD TEST (FECAL DNA COLORECTAL CANCER SCREENING)    VITAMIN D, 25 HYDROXY    montelukast (SINGULAIR) 10 mg tablet    traZODone (DESYREL) 100 mg tablet    atorvastatin (LIPITOR) 40 mg tablet    DISCONTD: losartan (COZAAR) 50 mg tablet    losartan (COZAAR) 25 mg tablet     Diagnoses and all orders for this visit:    1. Medicare annual wellness visit, subsequent          5. Insomnia, unspecified type  -     traZODone (DESYREL) 100 mg tablet; Take 1 Tab by mouth nightly for 90 days. 6. Legally blind  -     AMB SUPPLY ORDER  Patient cousin is requesting stick for blind people     7. Colon cancer screening  -     COLOGUARD TEST (FECAL DNA COLORECTAL CANCER SCREENING)    Screening prostate cancer     Check PSA         Follow-up and Dispositions    · Return in about 3 months (around 6/19/2020).      .  Brief written plan, checklist    I have discussed the diagnosis with the patient (and his cousin  ) and the intended plan as seen in the above orders. The patient has received an after-visit summary and questions were answered concerning future plans. I have discussed medication side effects and warnings with the patient as well. I have reviewed the plan of care with the patient, accepted their input and they are in agreement with the treatment goals. Follow-up and Dispositions    · Return in about 3 months (around 6/19/2020). ____________________________________________________________    Problem Assessment    for treatment of   Chief Complaint   Patient presents with    Medication Evaluation    Annual Wellness Visit         SUBJECTIVE    Assessment/Plan:        Diagnoses and all orders for this visit:    1. Medicare annual wellness visit, subsequent    2. Allergic rhinitis, unspecified seasonality, unspecified trigger  -     montelukast (SINGULAIR) 10 mg tablet; Take 1 Tab by mouth daily. 3. Mixed hyperlipidemia  -     LIPID PANEL; Future  -     atorvastatin (LIPITOR) 40 mg tablet; Take 1 Tab by mouth daily. 4. Essential hypertension  -     LIPID PANEL; Future  -     losartan (COZAAR) 25 mg tablet; Take 1 Tab by mouth daily. 5. Insomnia, unspecified type  -     traZODone (DESYREL) 100 mg tablet; Take 1 Tab by mouth nightly for 90 days. 6. Legally blind  -     AMB SUPPLY ORDER    7.  Colon cancer screening  -     COLOGUARD TEST (FECAL DNA COLORECTAL CANCER SCREENING)    8. Vitamin D deficiency  -     VITAMIN D, 25 HYDROXY; Future          Lab review: orders written for new lab studies as appropriate; see orders

## 2020-03-19 NOTE — PROGRESS NOTES
Rosy Weston is a 60 y.o. male (: 1955) presenting to address:    Chief Complaint   Patient presents with    Medication Evaluation    Annual Wellness Visit       Vitals:    20 1458   BP: 95/62   Pulse: (!) 112   Resp: 20   Temp: 98.5 °F (36.9 °C)   TempSrc: Oral   SpO2: 99%   Weight: 129 lb (58.5 kg)   Height: 5' 11\" (1.803 m)   PainSc:   0 - No pain     Vision: Blind both eyes    Learning Assessment:     Learning Assessment 2018   PRIMARY LEARNER Patient   HIGHEST LEVEL OF EDUCATION - PRIMARY LEARNER  GRADUATED HIGH SCHOOL OR GED   BARRIERS PRIMARY LEARNER VISUAL   CO-LEARNER CAREGIVER Yes   CO-LEARNER HIGHEST LEVEL OF EDUCATION GRADUATED HIGH SCHOOL OR GED   PRIMARY LANGUAGE ENGLISH   LEARNER PREFERENCE PRIMARY LISTENING   ANSWERED BY Michelle Bran   RELATIONSHIP OTHER     Depression Screening:     3 most recent PHQ Screens 3/19/2020   Little interest or pleasure in doing things Not at all   Feeling down, depressed, irritable, or hopeless Not at all   Total Score PHQ 2 0     Fall Risk Assessment:     Fall Risk Assessment, last 12 mths 3/19/2020   Able to walk? Yes   Fall in past 12 months? No     Abuse Screening:     Abuse Screening Questionnaire 3/19/2020   Do you ever feel afraid of your partner? N   Are you in a relationship with someone who physically or mentally threatens you? N   Is it safe for you to go home? Y     Coordination of Care Questionaire:   1. Have you been to the ER, urgent care clinic since your last visit? Hospitalized since your last visit? YES- [facility]-ED    2. Have you seen or consulted any other health care providers outside of the [address] [place] [address] since your last visit? Include any pap smears or colon screening. NO    Advanced Directive:   1. Do you have an Advanced Directive? YES    2. Would you like information on Advanced Directives?  NO

## 2020-03-19 NOTE — PROGRESS NOTES
Kwaku Crowe     Chief Complaint   Patient presents with    Medication Evaluation    Annual Wellness Visit     Vitals:    20 1458   BP: 95/62   Pulse: (!) 112   Resp: 20   Temp: 98.5 °F (36.9 °C)   TempSrc: Oral   SpO2: 99%   Weight: 129 lb (58.5 kg)   Height: 5' 11\" (1.803 m)   PainSc:   0 - No pain         HPI:Pina Cummins is here for follow-up accompanied by his cousin. He has been complaining about dry cough and allergic rhinitis symptoms, he was prescribed montelukast but he ran out of medication, he has not followed up since 2018!!! Medication has been reviewed with patient and updated. His blood pressure is low today is be repeated after the end of the visit as well. Decrease losartan to 25 mg daily. Patient has insomnia and he was prescribed trazodone 50 mg and he is requesting to increase to 100 mg because he wakes up after few hours of sleep. Past Medical History:   Diagnosis Date    Blindness     Glaucoma     Hypercholesterolemia     Hypertension     Tremors of nervous system      Past Surgical History:   Procedure Laterality Date    COLONOSCOPY N/A 2018    COLONOSCOPY performed by Comfort Lyn MD at [address] [place] [address] Drive ENDOSCOPY    HX HIP REPLACEMENT       Social History     Tobacco Use    Smoking status: Former Smoker     Last attempt to quit: 2018     Years since quittin.2    Smokeless tobacco: Never Used   Substance Use Topics    Alcohol use: No       No family history on file. Review of Systems   Constitutional: Negative for chills, fever, malaise/fatigue and weight loss. HENT: Negative for congestion, ear discharge, ear pain, hearing loss, nosebleeds, sinus pain and sore throat. Eyes: Negative for blurred vision, double vision and discharge. Respiratory: Positive for cough and sputum production. Negative for hemoptysis, shortness of breath and wheezing. Cardiovascular: Negative for chest pain, palpitations, claudication and leg swelling. Gastrointestinal: Negative for abdominal pain, constipation, diarrhea, nausea and vomiting. Genitourinary: Negative for dysuria, frequency and urgency. Musculoskeletal: Negative for back pain, falls, joint pain, myalgias and neck pain. Skin: Negative for itching and rash. Neurological: Negative for dizziness, tingling, sensory change, speech change, focal weakness, weakness and headaches. Psychiatric/Behavioral: Negative for depression, hallucinations, substance abuse and suicidal ideas. The patient is not nervous/anxious and does not have insomnia. Physical Exam  Vitals signs and nursing note reviewed. Constitutional:       General: He is not in acute distress. Appearance: He is well-developed. He is not diaphoretic. HENT:      Head: Normocephalic and atraumatic. Nose: Congestion and rhinorrhea present. Mouth/Throat:      Pharynx: No oropharyngeal exudate. Eyes:      General: No scleral icterus. Right eye: No discharge. Left eye: No discharge. Conjunctiva/sclera: Conjunctivae normal.      Pupils: Pupils are equal, round, and reactive to light. Neck:      Musculoskeletal: Normal range of motion and neck supple. Thyroid: No thyromegaly. Cardiovascular:      Rate and Rhythm: Normal rate and regular rhythm. Heart sounds: Normal heart sounds. Pulmonary:      Effort: Pulmonary effort is normal. No respiratory distress. Breath sounds: Normal breath sounds. No rales. Abdominal:      General: Bowel sounds are normal. There is no distension. Palpations: Abdomen is soft. There is no mass. Tenderness: There is no abdominal tenderness. There is no rebound. Musculoskeletal: Normal range of motion. General: No tenderness or deformity. Lymphadenopathy:      Cervical: No cervical adenopathy. Skin:     General: Skin is warm and dry. Findings: No erythema or rash.    Neurological:      Mental Status: He is alert and oriented to person, place, and time. Cranial Nerves: No cranial nerve deficit. Coordination: Coordination normal.   Psychiatric:         Thought Content: Thought content normal.         Judgment: Judgment normal.          Assessment and plan     Plan of care has been discussed with the patient, he agrees to the plan and verbalized understanding. All his questions were answered  More than 50% of the time spent in this visit was counseling the patient about  illness and treatment options         1. Allergic rhinitis, unspecified seasonality, unspecified trigger    - montelukast (SINGULAIR) 10 mg tablet; Take 1 Tab by mouth daily. Dispense: 90 Tab; Refill: 0    2. Mixed hyperlipidemia  Patient is on Lipitor 40 mg daily  - LIPID PANEL; Future  - atorvastatin (LIPITOR) 40 mg tablet; Take 1 Tab by mouth daily. Dispense: 90 Tab; Refill: 1    3. Essential hypertension  Pressure is low to decrease losartan to 25 mg daily, follow-up for recheck blood pressure  - LIPID PANEL; Future  - losartan (COZAAR) 25 mg tablet; Take 1 Tab by mouth daily. Dispense: 90 Tab; Refill: 1    4. Insomnia, unspecified type    - traZODone (DESYREL) 100 mg tablet; Take 1 Tab by mouth nightly for 90 days. Dispense: 90 Tab; Refill: 0        Current Outpatient Medications   Medication Sig Dispense Refill    montelukast (SINGULAIR) 10 mg tablet Take 1 Tab by mouth daily. 90 Tab 0    traZODone (DESYREL) 100 mg tablet Take 1 Tab by mouth nightly for 90 days. 90 Tab 0    atorvastatin (LIPITOR) 40 mg tablet Take 1 Tab by mouth daily. 90 Tab 1    losartan (COZAAR) 25 mg tablet Take 1 Tab by mouth daily. 90 Tab 1    diphenhydrAMINE (BENADRYL) 25 mg capsule Take 25 mg by mouth every six (6) hours as needed.          Patient Active Problem List    Diagnosis Date Noted    Insomnia 04/25/2018    Mixed hyperlipidemia 04/25/2018    Legally blind 04/25/2018    Essential hypertension 04/25/2018     Results for orders placed or performed during the hospital encounter of 61/98/28   METABOLIC PANEL, COMPREHENSIVE   Result Value Ref Range    Sodium 136 136 - 145 mmol/L    Potassium 3.8 3.5 - 5.5 mmol/L    Chloride 101 100 - 108 mmol/L    CO2 29 21 - 32 mmol/L    Anion gap 6 3.0 - 18 mmol/L    Glucose 75 74 - 99 mg/dL    BUN 15 7.0 - 18 MG/DL    Creatinine 0.94 0.6 - 1.3 MG/DL    BUN/Creatinine ratio 16 12 - 20      GFR est AA >60 >60 ml/min/1.73m2    GFR est non-AA >60 >60 ml/min/1.73m2    Calcium 8.5 8.5 - 10.1 MG/DL    Bilirubin, total 0.3 0.2 - 1.0 MG/DL    ALT (SGPT) 16 16 - 61 U/L    AST (SGOT) 19 15 - 37 U/L    Alk. phosphatase 135 (H) 45 - 117 U/L    Protein, total 8.1 6.4 - 8.2 g/dL    Albumin 3.7 3.4 - 5.0 g/dL    Globulin 4.4 (H) 2.0 - 4.0 g/dL    A-G Ratio 0.8 0.8 - 1.7     LIPID PANEL   Result Value Ref Range    LIPID PROFILE          Cholesterol, total 126 <200 MG/DL    Triglyceride 96 <150 MG/DL    HDL Cholesterol 42 40 - 60 MG/DL    LDL, calculated 64.8 0 - 100 MG/DL    VLDL, calculated 19.2 MG/DL    CHOL/HDL Ratio 3.0 0 - 5.0     HEPATITIS C AB   Result Value Ref Range    Hepatitis C virus Ab 0.07 <0.80 Index    Hep C  virus Ab Interp. NEGATIVE  NEG      Hep C  virus Ab comment           No visits with results within 3 Month(s) from this visit.    Latest known visit with results is:   Hospital Outpatient Visit on 12/10/2018   Component Date Value Ref Range Status    Sodium 12/10/2018 136  136 - 145 mmol/L Final    Potassium 12/10/2018 3.8  3.5 - 5.5 mmol/L Final    Chloride 12/10/2018 101  100 - 108 mmol/L Final    CO2 12/10/2018 29  21 - 32 mmol/L Final    Anion gap 12/10/2018 6  3.0 - 18 mmol/L Final    Glucose 12/10/2018 75  74 - 99 mg/dL Final    BUN 12/10/2018 15  7.0 - 18 MG/DL Final    Creatinine 12/10/2018 0.94  0.6 - 1.3 MG/DL Final    BUN/Creatinine ratio 12/10/2018 16  12 - 20   Final    GFR est AA 12/10/2018 >60  >60 ml/min/1.73m2 Final    GFR est non-AA 12/10/2018 >60  >60 ml/min/1.73m2 Final    Comment: (NOTE)  Estimated GFR is calculated using the Modification of Diet in Renal   Disease (MDRD) Study equation, reported for both  Americans   (GFRAA) and non- Americans (GFRNA), and normalized to 1.73m2   body surface area. The physician must decide which value applies to   the patient. The MDRD study equation should only be used in   individuals age 25 or older. It has not been validated for the   following: pregnant women, patients with serious comorbid conditions,   or on certain medications, or persons with extremes of body size,   muscle mass, or nutritional status.  Calcium 12/10/2018 8.5  8.5 - 10.1 MG/DL Final    Bilirubin, total 12/10/2018 0.3  0.2 - 1.0 MG/DL Final    ALT (SGPT) 12/10/2018 16  16 - 61 U/L Final    AST (SGOT) 12/10/2018 19  15 - 37 U/L Final    Alk. phosphatase 12/10/2018 135* 45 - 117 U/L Final    Protein, total 12/10/2018 8.1  6.4 - 8.2 g/dL Final    Albumin 12/10/2018 3.7  3.4 - 5.0 g/dL Final    Globulin 12/10/2018 4.4* 2.0 - 4.0 g/dL Final    A-G Ratio 12/10/2018 0.8  0.8 - 1.7   Final    LIPID PROFILE 12/10/2018        Final    Cholesterol, total 12/10/2018 126  <200 MG/DL Final    Triglyceride 12/10/2018 96  <150 MG/DL Final    Comment: The drugs N-acetylcysteine (NAC) and  Metamiszole have been found to cause falsely  low results in this chemical assay. Please  be sure to submit blood samples obtained  BEFORE administration of either of these  drugs to assure correct results.  HDL Cholesterol 12/10/2018 42  40 - 60 MG/DL Final    LDL, calculated 12/10/2018 64.8  0 - 100 MG/DL Final    VLDL, calculated 12/10/2018 19.2  MG/DL Final    CHOL/HDL Ratio 12/10/2018 3.0  0 - 5.0   Final    Hepatitis C virus Ab 12/10/2018 0.07  <0.80 Index Final    Hep C  virus Ab Interp. 12/10/2018 NEGATIVE   NEG   Final    Hep C  virus Ab comment 12/10/2018        Final    Comment: Index <0.80. ......................... Woo Joaquin Negative  Index > or = to 0.80 and <1.00. .... Woo Joaquin Equivocal  Index >1. 00.......................... Eliceo Calderon Positive          For Equivocal or Positive results, confirmation with Hepatitis C RNA by PCR or bDNA is suggested. Follow-up and Dispositions    · Return in about 3 months (around 6/19/2020).

## 2020-06-14 ENCOUNTER — TELEPHONE (OUTPATIENT)
Dept: FAMILY MEDICINE CLINIC | Age: 65
End: 2020-06-14

## 2020-06-15 NOTE — TELEPHONE ENCOUNTER
Unable to reach patient however he does have an up coming appointment    Future Appointments   Date Time Provider Sulaiman Frias   6/24/2020  3:00 PM Clifford Rodas MD [facility], [place]

## 2020-06-30 ENCOUNTER — OFFICE VISIT (OUTPATIENT)
Dept: FAMILY MEDICINE CLINIC | Age: 65
End: 2020-06-30

## 2020-06-30 VITALS
BODY MASS INDEX: 17.22 KG/M2 | SYSTOLIC BLOOD PRESSURE: 96 MMHG | WEIGHT: 123 LBS | RESPIRATION RATE: 18 BRPM | TEMPERATURE: 99.1 F | HEART RATE: 97 BPM | OXYGEN SATURATION: 100 % | DIASTOLIC BLOOD PRESSURE: 65 MMHG | HEIGHT: 71 IN

## 2020-06-30 DIAGNOSIS — E55.9 VITAMIN D DEFICIENCY: ICD-10-CM

## 2020-06-30 DIAGNOSIS — L85.3 DRY SKIN DERMATITIS: ICD-10-CM

## 2020-06-30 DIAGNOSIS — L30.9 DERMATITIS: ICD-10-CM

## 2020-06-30 DIAGNOSIS — H61.23 BILATERAL IMPACTED CERUMEN: ICD-10-CM

## 2020-06-30 DIAGNOSIS — D50.8 OTHER IRON DEFICIENCY ANEMIA: ICD-10-CM

## 2020-06-30 DIAGNOSIS — H54.8 LEGALLY BLIND: ICD-10-CM

## 2020-06-30 DIAGNOSIS — I10 ESSENTIAL HYPERTENSION: Primary | ICD-10-CM

## 2020-06-30 DIAGNOSIS — J30.9 ALLERGIC RHINITIS, UNSPECIFIED SEASONALITY, UNSPECIFIED TRIGGER: ICD-10-CM

## 2020-06-30 DIAGNOSIS — G47.00 INSOMNIA, UNSPECIFIED TYPE: ICD-10-CM

## 2020-06-30 RX ORDER — MONTELUKAST SODIUM 10 MG/1
10 TABLET ORAL DAILY
Qty: 90 TAB | Refills: 0 | Status: SHIPPED | OUTPATIENT
Start: 2020-06-30 | End: 2020-10-06

## 2020-06-30 RX ORDER — TRAZODONE HYDROCHLORIDE 100 MG/1
100 TABLET ORAL
COMMUNITY
End: 2021-03-05 | Stop reason: SDUPTHER

## 2020-06-30 NOTE — PROGRESS NOTES
Jean Pierre Irene     Chief Complaint   Patient presents with   [facility] Medication Management     follow up     Vitals:    20 1440   BP: 96/65   Pulse: 97   Resp: 18   Temp: 99.1 °F (37.3 °C)   TempSrc: Oral   SpO2: 100%   Weight: 123 lb (55.8 kg)   Height: 5' 11\" (1.803 m)   PainSc:   0 - No pain         HPI: Mr. Danny Thomas is accompanied by his cousin today. He is here for follow-up and medication refill    He has been complaining about a skin rash and itching mainly in his lower extremities for few months. Is also include patches in his hands and thighs, he has not used any medication for it. Patient need to get the report done also need to complete Cologuard kit      Past Medical History:   Diagnosis Date    Blindness     Glaucoma     Hypercholesterolemia     Hypertension     Tremors of nervous system      Past Surgical History:   Procedure Laterality Date    COLONOSCOPY N/A 2018    COLONOSCOPY performed by Easton Vidal MD at [facility] ENDOSCOPY    HX HIP REPLACEMENT       Social History     Tobacco Use    Smoking status: Former Smoker     Last attempt to quit: 2018     Years since quittin.4    Smokeless tobacco: Never Used   Substance Use Topics    Alcohol use: No       No family history on file. Review of Systems   Constitutional: Negative for chills, fever, malaise/fatigue and weight loss. HENT: Negative for congestion, ear discharge, ear pain, hearing loss, nosebleeds, sinus pain and sore throat. Ear wax, he feel his ears are blocked   Eyes: Negative for blurred vision, double vision and discharge. Respiratory: Negative for cough, hemoptysis, sputum production, shortness of breath and wheezing. Cardiovascular: Negative for chest pain, palpitations, claudication and leg swelling. Gastrointestinal: Negative for abdominal pain, constipation, diarrhea, heartburn, nausea and vomiting. Genitourinary: Negative for dysuria, frequency and urgency.    Musculoskeletal: Negative for myalgias. Skin: Positive for itching and rash. Neurological: Negative for dizziness, tingling, sensory change, speech change, focal weakness, weakness and headaches. Psychiatric/Behavioral: Negative for depression, hallucinations, substance abuse and suicidal ideas. The patient has insomnia. The patient is not nervous/anxious. Physical Exam  Constitutional:       General: He is not in acute distress. Appearance: He is well-developed. He is not diaphoretic. HENT:      Head: Normocephalic and atraumatic. Right Ear: There is impacted cerumen. Left Ear: There is impacted cerumen. Neck:      Musculoskeletal: Normal range of motion and neck supple. Thyroid: No thyromegaly. Cardiovascular:      Rate and Rhythm: Normal rate and regular rhythm. Heart sounds: Normal heart sounds. Pulmonary:      Effort: Pulmonary effort is normal. No respiratory distress. Breath sounds: Normal breath sounds. No rales. Abdominal:      General: Bowel sounds are normal. There is no distension. Palpations: Abdomen is soft. There is no mass. Tenderness: There is no abdominal tenderness. There is no rebound. Musculoskeletal: Normal range of motion. General: No tenderness or deformity. Lymphadenopathy:      Cervical: No cervical adenopathy. Skin:     General: Skin is warm and dry. Findings: No erythema or rash. Neurological:      Mental Status: He is alert and oriented to person, place, and time. Cranial Nerves: No cranial nerve deficit. Coordination: Coordination normal.   Psychiatric:         Thought Content: Thought content normal.         Judgment: Judgment normal.          Assessment and plan     Plan of care has been discussed with the patient, he agrees to the plan and verbalized understanding.   All his questions were answered  More than 50% of the time spent in this visit was counseling the patient about  illness and treatment options 1. Allergic rhinitis, unspecified seasonality, unspecified trigger  Is stable on Singulair  - montelukast (SINGULAIR) 10 mg tablet; Take 1 Tab by mouth daily. Dispense: 90 Tab; Refill: 0    2. Bilateral impacted cerumen  Bilateral ear lavage was done by the nurse Amarilys Jones  - REMOVAL IMPACTED CERUMEN IRRIGATION/LVG UNILAT    3. Insomnia, unspecified type  He takes trazodone every night    4. Essential hypertension    - CBC WITH AUTOMATED DIFF; Future  - METABOLIC PANEL, COMPREHENSIVE; Future    5. Dermatitis    - mineral oil-isopropyl myristat (EUCERIN) lotion; Apply  to affected area as needed for Dry Skin for up to 30 days. Dispense: 472 mL; Refill: 5  - REFERRAL TO DERMATOLOGY    6. Dry skin dermatitis    - mineral oil-isopropyl myristat (EUCERIN) lotion; Apply  to affected area as needed for Dry Skin for up to 30 days. Dispense: 472 mL; Refill: 5  - REFERRAL TO DERMATOLOGY    7. Legally blind      Current Outpatient Medications   Medication Sig Dispense Refill    traZODone (DESYREL) 100 mg tablet Take 100 mg by mouth nightly.  montelukast (SINGULAIR) 10 mg tablet Take 1 Tab by mouth daily. 90 Tab 0    mineral oil-isopropyl myristat (EUCERIN) lotion Apply  to affected area as needed for Dry Skin for up to 30 days. 472 mL 5    atorvastatin (LIPITOR) 40 mg tablet Take 1 Tab by mouth daily. 90 Tab 1    losartan (COZAAR) 25 mg tablet Take 1 Tab by mouth daily.  90 Tab 1       Patient Active Problem List    Diagnosis Date Noted    Insomnia 04/25/2018    Mixed hyperlipidemia 04/25/2018    Legally blind 04/25/2018    Essential hypertension 04/25/2018     Results for orders placed or performed during the hospital encounter of 65/49/26   METABOLIC PANEL, COMPREHENSIVE   Result Value Ref Range    Sodium 136 136 - 145 mmol/L    Potassium 3.8 3.5 - 5.5 mmol/L    Chloride 101 100 - 108 mmol/L    CO2 29 21 - 32 mmol/L    Anion gap 6 3.0 - 18 mmol/L    Glucose 75 74 - 99 mg/dL    BUN 15 7.0 - 18 MG/DL Creatinine 0.94 0.6 - 1.3 MG/DL    BUN/Creatinine ratio 16 12 - 20      GFR est AA >60 >60 ml/min/1.73m2    GFR est non-AA >60 >60 ml/min/1.73m2    Calcium 8.5 8.5 - 10.1 MG/DL    Bilirubin, total 0.3 0.2 - 1.0 MG/DL    ALT (SGPT) 16 16 - 61 U/L    AST (SGOT) 19 15 - 37 U/L    Alk. phosphatase 135 (H) 45 - 117 U/L    Protein, total 8.1 6.4 - 8.2 g/dL    Albumin 3.7 3.4 - 5.0 g/dL    Globulin 4.4 (H) 2.0 - 4.0 g/dL    A-G Ratio 0.8 0.8 - 1.7     LIPID PANEL   Result Value Ref Range    LIPID PROFILE          Cholesterol, total 126 <200 MG/DL    Triglyceride 96 <150 MG/DL    HDL Cholesterol 42 40 - 60 MG/DL    LDL, calculated 64.8 0 - 100 MG/DL    VLDL, calculated 19.2 MG/DL    CHOL/HDL Ratio 3.0 0 - 5.0     HEPATITIS C AB   Result Value Ref Range    Hepatitis C virus Ab 0.07 <0.80 Index    Hep C  virus Ab Interp. NEGATIVE  NEG      Hep C  virus Ab comment           No visits with results within 3 Month(s) from this visit. Latest known visit with results is:   Hospital Outpatient Visit on 12/10/2018   Component Date Value Ref Range Status    Sodium 12/10/2018 136  136 - 145 mmol/L Final    Potassium 12/10/2018 3.8  3.5 - 5.5 mmol/L Final    Chloride 12/10/2018 101  100 - 108 mmol/L Final    CO2 12/10/2018 29  21 - 32 mmol/L Final    Anion gap 12/10/2018 6  3.0 - 18 mmol/L Final    Glucose 12/10/2018 75  74 - 99 mg/dL Final    BUN 12/10/2018 15  7.0 - 18 MG/DL Final    Creatinine 12/10/2018 0.94  0.6 - 1.3 MG/DL Final    BUN/Creatinine ratio 12/10/2018 16  12 - 20   Final    GFR est AA 12/10/2018 >60  >60 ml/min/1.73m2 Final    GFR est non-AA 12/10/2018 >60  >60 ml/min/1.73m2 Final    Comment: (NOTE)  Estimated GFR is calculated using the Modification of Diet in Renal   Disease (MDRD) Study equation, reported for both  Americans   (GFRAA) and non- Americans (GFRNA), and normalized to 1.73m2   body surface area. The physician must decide which value applies to   the patient.  The MDRD study equation should only be used in   individuals age 25 or older. It has not been validated for the   following: pregnant women, patients with serious comorbid conditions,   or on certain medications, or persons with extremes of body size,   muscle mass, or nutritional status.  Calcium 12/10/2018 8.5  8.5 - 10.1 MG/DL Final    Bilirubin, total 12/10/2018 0.3  0.2 - 1.0 MG/DL Final    ALT (SGPT) 12/10/2018 16  16 - 61 U/L Final    AST (SGOT) 12/10/2018 19  15 - 37 U/L Final    Alk. phosphatase 12/10/2018 135* 45 - 117 U/L Final    Protein, total 12/10/2018 8.1  6.4 - 8.2 g/dL Final    Albumin 12/10/2018 3.7  3.4 - 5.0 g/dL Final    Globulin 12/10/2018 4.4* 2.0 - 4.0 g/dL Final    A-G Ratio 12/10/2018 0.8  0.8 - 1.7   Final    LIPID PROFILE 12/10/2018        Final    Cholesterol, total 12/10/2018 126  <200 MG/DL Final    Triglyceride 12/10/2018 96  <150 MG/DL Final    Comment: The drugs N-acetylcysteine (NAC) and  Metamiszole have been found to cause falsely  low results in this chemical assay. Please  be sure to submit blood samples obtained  BEFORE administration of either of these  drugs to assure correct results.  HDL Cholesterol 12/10/2018 42  40 - 60 MG/DL Final    LDL, calculated 12/10/2018 64.8  0 - 100 MG/DL Final    VLDL, calculated 12/10/2018 19.2  MG/DL Final    CHOL/HDL Ratio 12/10/2018 3.0  0 - 5.0   Final    Hepatitis C virus Ab 12/10/2018 0.07  <0.80 Index Final    Hep C  virus Ab Interp. 12/10/2018 NEGATIVE   NEG   Final    Hep C  virus Ab comment 12/10/2018        Final    Comment: Index <0.80. ......................... Frieda Dye Negative  Index > or = to 0.80 and <1.00. .... Frieda Dye Frieda Dye Equivocal  Index >1.00. ......................... Frieda Dye Positive          For Equivocal or Positive results, confirmation with Hepatitis C RNA by PCR or bDNA is suggested. Follow-up and Dispositions    · Return in about 3 months (around 9/30/2020).

## 2020-06-30 NOTE — PROGRESS NOTES
Reginaldo Enriquez is a 63 y.o. male (: 1955) presenting to address:    Chief Complaint   Patient presents with    Medication Management     follow up       Vitals:    20 1440   BP: 96/65   Pulse: 97   Resp: 18   Temp: 99.1 °F (37.3 °C)   TempSrc: Oral   SpO2: 100%   Weight: 123 lb (55.8 kg)   Height: 5' 11\" (1.803 m)   PainSc:   0 - No pain       Hearing/Vision:   No exam data present    Learning Assessment:     Learning Assessment 2018   PRIMARY LEARNER Patient   HIGHEST LEVEL OF EDUCATION - PRIMARY LEARNER  GRADUATED HIGH SCHOOL OR GED   BARRIERS PRIMARY LEARNER VISUAL   CO-LEARNER CAREGIVER Yes   CO-LEARNER HIGHEST LEVEL OF EDUCATION GRADUATED HIGH SCHOOL OR GED   PRIMARY LANGUAGE ENGLISH   LEARNER PREFERENCE PRIMARY LISTENING   ANSWERED BY Mark Ha   RELATIONSHIP OTHER     Depression Screening:     3 most recent PHQ Screens 2020   Little interest or pleasure in doing things Not at all   Feeling down, depressed, irritable, or hopeless Not at all   Total Score PHQ 2 0     Fall Risk Assessment:     Fall Risk Assessment, last 12 mths 2020   Able to walk? Yes   Fall in past 12 months? -     Abuse Screening:     Abuse Screening Questionnaire 2020   Do you ever feel afraid of your partner? N   Are you in a relationship with someone who physically or mentally threatens you? N   Is it safe for you to go home? Y     Coordination of Care Questionaire:   1. Have you been to the ER, urgent care clinic since your last visit? Hospitalized since your last visit? NO    2. Have you seen or consulted any other health care providers outside of the [address] [place] [address] since your last visit? Include any pap smears or colon screening. NO    Advanced Directive:   1. Do you have an Advanced Directive? NO    2. Would you like information on Advanced Directives?  NO

## 2020-07-01 LAB
25(OH)D3 SERPL-MCNC: 22.3 NG/ML (ref 32–100)
A-G RATIO,AGRAT: 1 RATIO (ref 1.1–2.6)
ABSOLUTE LYMPHOCYTE COUNT, 10803: 2.3 K/UL (ref 1–4.8)
ALBUMIN SERPL-MCNC: 3.7 G/DL (ref 3.5–5)
ALP SERPL-CCNC: 92 U/L (ref 40–125)
ALT SERPL-CCNC: 10 U/L (ref 5–40)
ANION GAP SERPL CALC-SCNC: 12 MMOL/L
AST SERPL W P-5'-P-CCNC: 15 U/L (ref 10–37)
BASOPHILS # BLD: 0.1 K/UL (ref 0–0.2)
BASOPHILS NFR BLD: 1 % (ref 0–2)
BILIRUB SERPL-MCNC: 0.3 MG/DL (ref 0.2–1.2)
BUN SERPL-MCNC: 11 MG/DL (ref 6–22)
CALCIUM SERPL-MCNC: 8.6 MG/DL (ref 8.4–10.5)
CHLORIDE SERPL-SCNC: 97 MMOL/L (ref 98–110)
CHOLEST SERPL-MCNC: 101 MG/DL (ref 110–200)
CO2 SERPL-SCNC: 25 MMOL/L (ref 20–32)
CREAT SERPL-MCNC: 0.9 MG/DL (ref 0.8–1.6)
EOSINOPHIL # BLD: 0.1 K/UL (ref 0–0.5)
EOSINOPHIL NFR BLD: 2 % (ref 0–6)
ERYTHROCYTE [DISTWIDTH] IN BLOOD BY AUTOMATED COUNT: 15.5 % (ref 10–15.5)
GFRAA, 66117: >60
GFRNA, 66118: >60
GLOBULIN,GLOB: 3.7 G/DL (ref 2–4)
GLUCOSE SERPL-MCNC: 98 MG/DL (ref 70–99)
GRANULOCYTES,GRANS: 64 % (ref 40–75)
HCT VFR BLD AUTO: 36.2 % (ref 39.3–51.6)
HDLC SERPL-MCNC: 2.7 MG/DL (ref 0–5)
HDLC SERPL-MCNC: 37 MG/DL
HGB BLD-MCNC: 10.9 G/DL (ref 13.1–17.2)
LDL/HDL RATIO,LDHD: 1.4
LDLC SERPL CALC-MCNC: 53 MG/DL (ref 50–99)
LYMPHOCYTES, LYMLT: 26 % (ref 20–45)
MCH RBC QN AUTO: 28 PG (ref 26–34)
MCHC RBC AUTO-ENTMCNC: 30 G/DL (ref 31–36)
MCV RBC AUTO: 92 FL (ref 80–95)
MONOCYTES # BLD: 0.8 K/UL (ref 0.1–1)
MONOCYTES NFR BLD: 9 % (ref 3–12)
NEUTROPHILS # BLD AUTO: 5.8 K/UL (ref 1.8–7.7)
NON-HDL CHOLESTEROL, 011976: 64 MG/DL
PLATELET # BLD AUTO: 354 K/UL (ref 140–440)
PMV BLD AUTO: 10 FL (ref 9–13)
POTASSIUM SERPL-SCNC: 4 MMOL/L (ref 3.5–5.5)
PROT SERPL-MCNC: 7.4 G/DL (ref 6.2–8.1)
PSA SERPL-MCNC: 1.02 NG/ML
RBC # BLD AUTO: 3.95 M/UL (ref 3.8–5.8)
SODIUM SERPL-SCNC: 134 MMOL/L (ref 133–145)
TRIGL SERPL-MCNC: 55 MG/DL (ref 40–149)
VLDLC SERPL CALC-MCNC: 11 MG/DL (ref 8–30)
WBC # BLD AUTO: 9.1 K/UL (ref 4–11)

## 2020-07-02 RX ORDER — LANOLIN ALCOHOL/MO/W.PET/CERES
65 CREAM (GRAM) TOPICAL
Qty: 90 TAB | Refills: 0 | Status: SHIPPED | OUTPATIENT
Start: 2020-07-02 | End: 2020-10-06

## 2020-07-02 RX ORDER — ERGOCALCIFEROL 1.25 MG/1
50000 CAPSULE ORAL
Qty: 12 CAP | Refills: 1 | Status: SHIPPED | OUTPATIENT
Start: 2020-07-02 | End: 2021-03-05 | Stop reason: SDUPTHER

## 2020-07-02 NOTE — PROGRESS NOTES
Low vit D to start vit D weekly supplement    Low hemoglobin level  To start iron supplement and repeat CBC in 1 month     To consider further evaluation if no improvement

## 2020-09-15 DIAGNOSIS — I10 ESSENTIAL HYPERTENSION: ICD-10-CM

## 2020-09-20 RX ORDER — LOSARTAN POTASSIUM 25 MG/1
TABLET ORAL
Qty: 90 TAB | Refills: 0 | Status: SHIPPED | OUTPATIENT
Start: 2020-09-20 | End: 2020-10-28

## 2020-10-04 DIAGNOSIS — J30.9 ALLERGIC RHINITIS, UNSPECIFIED SEASONALITY, UNSPECIFIED TRIGGER: ICD-10-CM

## 2020-10-04 DIAGNOSIS — D50.8 OTHER IRON DEFICIENCY ANEMIA: ICD-10-CM

## 2020-10-06 RX ORDER — MONTELUKAST SODIUM 10 MG/1
TABLET ORAL
Qty: 90 TAB | Refills: 0 | Status: SHIPPED | OUTPATIENT
Start: 2020-10-06 | End: 2021-03-05 | Stop reason: SDUPTHER

## 2020-10-06 RX ORDER — LANOLIN ALCOHOL/MO/W.PET/CERES
CREAM (GRAM) TOPICAL
Qty: 90 TAB | Refills: 0 | Status: SHIPPED | OUTPATIENT
Start: 2020-10-06 | End: 2021-01-25

## 2020-10-25 DIAGNOSIS — I10 ESSENTIAL HYPERTENSION: ICD-10-CM

## 2020-10-28 RX ORDER — LOSARTAN POTASSIUM 25 MG/1
TABLET ORAL
Qty: 90 TAB | Refills: 0 | Status: SHIPPED | OUTPATIENT
Start: 2020-10-28 | End: 2021-01-25

## 2021-01-23 DIAGNOSIS — D50.8 OTHER IRON DEFICIENCY ANEMIA: ICD-10-CM

## 2021-01-23 DIAGNOSIS — I10 ESSENTIAL HYPERTENSION: ICD-10-CM

## 2021-01-23 DIAGNOSIS — E78.2 MIXED HYPERLIPIDEMIA: ICD-10-CM

## 2021-01-25 RX ORDER — TITANIUM DIOXIDE/OXYBEN/CINOX
LOTION (ML) TOPICAL
Qty: 90 TAB | Refills: 0 | Status: SHIPPED | OUTPATIENT
Start: 2021-01-25 | End: 2021-06-05 | Stop reason: SDUPTHER

## 2021-01-25 RX ORDER — ATORVASTATIN CALCIUM 40 MG/1
TABLET, FILM COATED ORAL
Qty: 90 TAB | Refills: 0 | Status: SHIPPED | OUTPATIENT
Start: 2021-01-25 | End: 2021-04-25

## 2021-01-25 RX ORDER — LOSARTAN POTASSIUM 25 MG/1
TABLET ORAL
Qty: 90 TAB | Refills: 0 | Status: SHIPPED | OUTPATIENT
Start: 2021-01-25 | End: 2021-05-11 | Stop reason: SDUPTHER

## 2021-01-26 NOTE — TELEPHONE ENCOUNTER
Appt made.      Future Appointments   Date Time Provider Sulaiamn Altagracia   2/2/2021  2:45 PM Justin Soto MD [facility]

## 2021-03-05 ENCOUNTER — VIRTUAL VISIT (OUTPATIENT)
Dept: FAMILY MEDICINE CLINIC | Age: 66
End: 2021-03-05
Payer: MEDICARE

## 2021-03-05 DIAGNOSIS — J30.9 ALLERGIC RHINITIS, UNSPECIFIED SEASONALITY, UNSPECIFIED TRIGGER: ICD-10-CM

## 2021-03-05 DIAGNOSIS — I10 ESSENTIAL HYPERTENSION: Primary | ICD-10-CM

## 2021-03-05 DIAGNOSIS — G47.00 INSOMNIA, UNSPECIFIED TYPE: ICD-10-CM

## 2021-03-05 DIAGNOSIS — E55.9 VITAMIN D DEFICIENCY: ICD-10-CM

## 2021-03-05 PROCEDURE — G8419 CALC BMI OUT NRM PARAM NOF/U: HCPCS | Performed by: LEGAL MEDICINE

## 2021-03-05 PROCEDURE — G8432 DEP SCR NOT DOC, RNG: HCPCS | Performed by: LEGAL MEDICINE

## 2021-03-05 PROCEDURE — 3017F COLORECTAL CA SCREEN DOC REV: CPT | Performed by: LEGAL MEDICINE

## 2021-03-05 PROCEDURE — 99214 OFFICE O/P EST MOD 30 MIN: CPT | Performed by: LEGAL MEDICINE

## 2021-03-05 PROCEDURE — G8756 NO BP MEASURE DOC: HCPCS | Performed by: LEGAL MEDICINE

## 2021-03-05 PROCEDURE — G8428 CUR MEDS NOT DOCUMENT: HCPCS | Performed by: LEGAL MEDICINE

## 2021-03-05 PROCEDURE — G0463 HOSPITAL OUTPT CLINIC VISIT: HCPCS | Performed by: LEGAL MEDICINE

## 2021-03-05 NOTE — PROGRESS NOTES
Enzo Velasco is a 63 y.o. male (: 1955) presenting to address:    Chief Complaint   Patient presents with    Hypertension     follow up       There were no vitals filed for this visit. Hearing/Vision:   No exam data present    Learning Assessment:     Learning Assessment 2018   PRIMARY LEARNER Patient   HIGHEST LEVEL OF EDUCATION - PRIMARY LEARNER  GRADUATED HIGH SCHOOL OR GED   BARRIERS PRIMARY LEARNER VISUAL   CO-LEARNER CAREGIVER Yes   CO-LEARNER HIGHEST LEVEL OF EDUCATION GRADUATED HIGH SCHOOL OR GED   PRIMARY LANGUAGE ENGLISH   LEARNER PREFERENCE PRIMARY LISTENING   ANSWERED BY Adama Quinteros   RELATIONSHIP OTHER     Depression Screening:     3 most recent PHQ Screens 3/5/2021   Little interest or pleasure in doing things Not at all   Feeling down, depressed, irritable, or hopeless Not at all   Total Score PHQ 2 0     Fall Risk Assessment:     Fall Risk Assessment, last 12 mths 2020   Able to walk? Yes   Fall in past 12 months? No     Abuse Screening:     Abuse Screening Questionnaire 2020   Do you ever feel afraid of your partner? N   Are you in a relationship with someone who physically or mentally threatens you? N   Is it safe for you to go home? Y     Coordination of Care Questionaire:   1. Have you been to the ER, urgent care clinic since your last visit? Hospitalized since your last visit? NO    2. Have you seen or consulted any other health care providers outside of the [address] [place] [address] since your last visit? Include any pap smears or colon screening. NO    Advanced Directive:   1. Do you have an Advanced Directive? NO    2. Would you like information on Advanced Directives?  NO

## 2021-03-05 NOTE — PROGRESS NOTES
Reginaldo Enriquez is a 63 y.o. male who was seen by synchronous (real-time) audio-video technology on 3/5/2021 for Hypertension (follow up)    Patient is here for follow-up accompanied by his cousin his usual    He is doing well with no complaints    Review of system is negative    He need medication refill today  He is also due for blood work that will be done next visit when he comes for his wellness visit    Assessment & Plan:   Diagnoses and all orders for this visit:    1. Essential hypertension  Blood pressure stable on current medication  2. Allergic rhinitis, unspecified seasonality, unspecified trigger     he had stopped Singulair for few months noting a consistent runny nose and he would like to resume medication      -     montelukast (SINGULAIR) 10 mg tablet; Take 1 tablet by mouth once daily    3. Vitamin D deficiency   need to continue vitamin D supplements    -     ergocalciferol (ERGOCALCIFEROL) 1,250 mcg (50,000 unit) capsule; Take 1 Cap by mouth every seven (7) days for 90 days. 4. Insomnia, unspecified type    Stable on current medications  -     traZODone (DESYREL) 100 mg tablet; Take 1 Tab by mouth nightly for 90 days. 712  Subjective:       Prior to Admission medications    Medication Sig Start Date End Date Taking? Authorizing Provider   montelukast (SINGULAIR) 10 mg tablet Take 1 tablet by mouth once daily 3/7/21  Yes Turner Allison MD   ergocalciferol (ERGOCALCIFEROL) 1,250 mcg (50,000 unit) capsule Take 1 Cap by mouth every seven (7) days for 90 days. 3/7/21 6/5/21 Yes Turner Allison MD   traZODone (DESYREL) 100 mg tablet Take 1 Tab by mouth nightly for 90 days.  3/7/21 6/5/21 Yes uTrner Allison MD   losartan (COZAAR) 25 mg tablet Take 1 tablet by mouth once daily 1/25/21  Yes Turner Allison MD   atorvastatin (LIPITOR) 40 mg tablet Take 1 tablet by mouth once daily 1/25/21  Yes Turner Allison MD   Iron 325 mg (65 mg iron) tablet TAKE 1 TABLET BY MOUTH ONCE DAILY BEFORE BREAKFAST FOR 90 DAYS 1/25/21  Yes Lei Smith MD     Patient Active Problem List   Diagnosis Code    Insomnia G47.00    Mixed hyperlipidemia E78.2    Legally blind H54.8    Essential hypertension I10     Patient Active Problem List    Diagnosis Date Noted    Insomnia 04/25/2018    Mixed hyperlipidemia 04/25/2018    Legally blind 04/25/2018    Essential hypertension 04/25/2018     Current Outpatient Medications   Medication Sig Dispense Refill    montelukast (SINGULAIR) 10 mg tablet Take 1 tablet by mouth once daily 90 Tab 1    ergocalciferol (ERGOCALCIFEROL) 1,250 mcg (50,000 unit) capsule Take 1 Cap by mouth every seven (7) days for 90 days. 12 Cap 1    traZODone (DESYREL) 100 mg tablet Take 1 Tab by mouth nightly for 90 days. 90 Tab 1    losartan (COZAAR) 25 mg tablet Take 1 tablet by mouth once daily 90 Tab 0    atorvastatin (LIPITOR) 40 mg tablet Take 1 tablet by mouth once daily 90 Tab 0    Iron 325 mg (65 mg iron) tablet TAKE 1 TABLET BY MOUTH ONCE DAILY BEFORE BREAKFAST FOR 90 DAYS 90 Tab 0     No Known Allergies  Past Medical History:   Diagnosis Date    Blindness 58    Glaucoma     Hypercholesterolemia     Hypertension     Tremors of nervous system      Past Surgical History:   Procedure Laterality Date    COLONOSCOPY N/A 7/26/2018    COLONOSCOPY performed by Sharan Muñiz MD at [facility] ENDOSCOPY    HX HIP REPLACEMENT       No family history on file. Social History     Tobacco Use    Smoking status: Former Smoker     Quit date: 1/1/2018     Years since quitting: 3.1    Smokeless tobacco: Never Used   Substance Use Topics    Alcohol use: No       Review of Systems   Constitutional: Negative for chills, fever, malaise/fatigue and weight loss. HENT: Negative for congestion, ear discharge, ear pain, hearing loss and nosebleeds. Eyes:        Patient is legally blind    Respiratory: Negative for cough.     Cardiovascular: Negative for chest pain, palpitations, claudication and leg swelling. Gastrointestinal: Negative for abdominal pain, constipation, diarrhea, nausea and vomiting. Genitourinary: Negative for dysuria, frequency and urgency. Musculoskeletal: Negative for back pain, falls, joint pain, myalgias and neck pain. Skin: Negative for itching and rash. Neurological: Negative for dizziness, tingling, sensory change, speech change, focal weakness, weakness and headaches. Psychiatric/Behavioral: Negative for depression and suicidal ideas. The patient has insomnia. Objective:   No flowsheet data found. General: alert, cooperative, no distress   Mental  status: normal mood, behavior, speech, dress, motor activity, and thought processes, able to follow commands   HENT: NCAT   Neck: no visualized mass   Resp: no respiratory distress   Neuro: no gross deficits   Skin: no discoloration or lesions of concern on visible areas   Psychiatric: normal affect, consistent with stated mood, no evidence of hallucinations     Additional exam findings: We discussed the expected course, resolution and complications of the diagnosis(es) in detail. Medication risks, benefits, costs, interactions, and alternatives were discussed as indicated. I advised him to contact the office if his condition worsens, changes or fails to improve as anticipated. He expressed understanding with the diagnosis(es) and plan. Sindydonaldo Loza, was evaluated through a synchronous (real-time) audio-video encounter. The patient (or guardian if applicable) is aware that this is a billable service. Verbal consent to proceed has been obtained within the past 12 months. The visit was conducted pursuant to the emergency declaration under the [facility], [address] [place] [address] authority and the [employer]. Patient identification was verified, and a caregiver was present when appropriate.  The patient was located in a state where the provider was credentialed to provide care.     Mack Cabrera MD

## 2021-03-07 ENCOUNTER — TELEPHONE (OUTPATIENT)
Dept: FAMILY MEDICINE CLINIC | Age: 66
End: 2021-03-07

## 2021-03-07 RX ORDER — MONTELUKAST SODIUM 10 MG/1
TABLET ORAL
Qty: 90 TAB | Refills: 1 | Status: SHIPPED | OUTPATIENT
Start: 2021-03-07 | End: 2021-05-28 | Stop reason: SDUPTHER

## 2021-03-07 RX ORDER — TRAZODONE HYDROCHLORIDE 100 MG/1
100 TABLET ORAL
Qty: 90 TAB | Refills: 1 | Status: SHIPPED | OUTPATIENT
Start: 2021-03-07 | End: 2021-05-28 | Stop reason: SDUPTHER

## 2021-03-07 RX ORDER — ERGOCALCIFEROL 1.25 MG/1
50000 CAPSULE ORAL
Qty: 12 CAP | Refills: 1 | Status: SHIPPED | OUTPATIENT
Start: 2021-03-07 | End: 2021-05-28 | Stop reason: SDUPTHER

## 2021-03-07 NOTE — LETTER
3/9/2021 9:53 AM 
 
Mr. Temo Ga Amy Ramirez [id number] [facility] [address] Dear  Mauro Jovi: We were unable to contact you. It is time to schedule your Medicare Wellness Visit! Please call our office at [phone] and schedule a follow up appointment for your continued care. Sincerely, Sury Beltre MD

## 2021-03-09 NOTE — TELEPHONE ENCOUNTER
1. Have you had increased asthma symptoms (chest tightness, increased cough,         wheezing or felt short of breath) in the past week? No    2. Have you had a marked increase in allergy symptoms(itchy eyes or nose, sneezing, runny nose, post nasal drip or throat clearing) in the past week? No    3. Do you have a cold, respiratory tract infection, or flu-like symptoms? No    4. Did you have any problems such as increased allergy or asthma symptoms, hives or generalized itching within 12 hours of receiving your allergy injection or swelling that persisted into the next day? No    5. Are you on any new medications such as eye drops, blood pressure or migraine medication?  No    Please specify:     6. Patient was seen by allergist in the last 12 months?  Yes    7. Are you taking your allergy medicine as prescribed? Yes    8. Do you have your Epi kit with you? Yes    9. Epi expiration date: 1/20/20     Staff notes:  Patient waited 30 minutes after injection and had arm(s) checked by the nurse prior to leaving.             Pt was called to schedule MWV but he doesn't have a voicemail setup on his phone number listed in chart

## 2021-04-24 DIAGNOSIS — E78.2 MIXED HYPERLIPIDEMIA: ICD-10-CM

## 2021-04-25 RX ORDER — ATORVASTATIN CALCIUM 40 MG/1
TABLET, FILM COATED ORAL
Qty: 90 TAB | Refills: 0 | Status: SHIPPED | OUTPATIENT
Start: 2021-04-25 | End: 2021-05-28 | Stop reason: SDUPTHER

## 2021-05-11 DIAGNOSIS — I10 ESSENTIAL HYPERTENSION: ICD-10-CM

## 2021-05-11 NOTE — TELEPHONE ENCOUNTER
Last seen 3/05/2021    Last refill 1/25/2021    Future Appointments   Date Time Provider Sulaiman Altagracia   5/28/2021  2:30 PM Leon Longo MD [facility]

## 2021-05-12 RX ORDER — LOSARTAN POTASSIUM 25 MG/1
TABLET ORAL
Qty: 90 TAB | Refills: 2 | Status: SHIPPED | OUTPATIENT
Start: 2021-05-12 | End: 2022-05-09 | Stop reason: SDUPTHER

## 2021-05-28 ENCOUNTER — APPOINTMENT (OUTPATIENT)
Dept: FAMILY MEDICINE CLINIC | Age: 66
End: 2021-05-28

## 2021-05-28 ENCOUNTER — OFFICE VISIT (OUTPATIENT)
Dept: FAMILY MEDICINE CLINIC | Age: 66
End: 2021-05-28
Payer: MEDICARE

## 2021-05-28 VITALS
WEIGHT: 116.2 LBS | RESPIRATION RATE: 16 BRPM | TEMPERATURE: 98.1 F | DIASTOLIC BLOOD PRESSURE: 75 MMHG | SYSTOLIC BLOOD PRESSURE: 109 MMHG | BODY MASS INDEX: 16.27 KG/M2 | HEIGHT: 71 IN | OXYGEN SATURATION: 98 % | HEART RATE: 84 BPM

## 2021-05-28 DIAGNOSIS — E78.2 MIXED HYPERLIPIDEMIA: ICD-10-CM

## 2021-05-28 DIAGNOSIS — D50.8 OTHER IRON DEFICIENCY ANEMIA: ICD-10-CM

## 2021-05-28 DIAGNOSIS — G47.00 INSOMNIA, UNSPECIFIED TYPE: ICD-10-CM

## 2021-05-28 DIAGNOSIS — R63.4 WEIGHT LOSS: ICD-10-CM

## 2021-05-28 DIAGNOSIS — J30.9 ALLERGIC RHINITIS, UNSPECIFIED SEASONALITY, UNSPECIFIED TRIGGER: ICD-10-CM

## 2021-05-28 DIAGNOSIS — I10 ESSENTIAL HYPERTENSION: ICD-10-CM

## 2021-05-28 DIAGNOSIS — E55.9 VITAMIN D DEFICIENCY: ICD-10-CM

## 2021-05-28 DIAGNOSIS — H54.8 LEGALLY BLIND: ICD-10-CM

## 2021-05-28 DIAGNOSIS — Z00.00 MEDICARE ANNUAL WELLNESS VISIT, SUBSEQUENT: Primary | ICD-10-CM

## 2021-05-28 DIAGNOSIS — R05.3 CHRONIC COUGH: ICD-10-CM

## 2021-05-28 DIAGNOSIS — F17.210 SMOKING GREATER THAN 30 PACK YEARS: ICD-10-CM

## 2021-05-28 PROCEDURE — G8754 DIAS BP LESS 90: HCPCS | Performed by: LEGAL MEDICINE

## 2021-05-28 PROCEDURE — G8419 CALC BMI OUT NRM PARAM NOF/U: HCPCS | Performed by: LEGAL MEDICINE

## 2021-05-28 PROCEDURE — G8427 DOCREV CUR MEDS BY ELIG CLIN: HCPCS | Performed by: LEGAL MEDICINE

## 2021-05-28 PROCEDURE — 3017F COLORECTAL CA SCREEN DOC REV: CPT | Performed by: LEGAL MEDICINE

## 2021-05-28 PROCEDURE — G8752 SYS BP LESS 140: HCPCS | Performed by: LEGAL MEDICINE

## 2021-05-28 PROCEDURE — 71046 X-RAY EXAM CHEST 2 VIEWS: CPT | Performed by: LEGAL MEDICINE

## 2021-05-28 PROCEDURE — G8432 DEP SCR NOT DOC, RNG: HCPCS | Performed by: LEGAL MEDICINE

## 2021-05-28 PROCEDURE — G0439 PPPS, SUBSEQ VISIT: HCPCS | Performed by: LEGAL MEDICINE

## 2021-05-28 RX ORDER — MONTELUKAST SODIUM 10 MG/1
TABLET ORAL
Qty: 90 TABLET | Refills: 2 | Status: SHIPPED | OUTPATIENT
Start: 2021-05-28 | End: 2022-04-07

## 2021-05-28 RX ORDER — TRAZODONE HYDROCHLORIDE 100 MG/1
100 TABLET ORAL
Qty: 90 TABLET | Refills: 3 | Status: SHIPPED | OUTPATIENT
Start: 2021-05-28 | End: 2022-06-06 | Stop reason: SDUPTHER

## 2021-05-28 RX ORDER — ERGOCALCIFEROL 1.25 MG/1
50000 CAPSULE ORAL
Qty: 12 CAPSULE | Refills: 3 | Status: SHIPPED | OUTPATIENT
Start: 2021-05-28 | End: 2022-06-07 | Stop reason: SDUPTHER

## 2021-05-28 RX ORDER — ATORVASTATIN CALCIUM 40 MG/1
40 TABLET, FILM COATED ORAL DAILY
Qty: 90 TABLET | Refills: 2 | Status: SHIPPED | OUTPATIENT
Start: 2021-05-28 | End: 2022-05-09 | Stop reason: SDUPTHER

## 2021-05-28 NOTE — PROGRESS NOTES
Power Ivey is a 63 y.o. male (: 1955) presenting to address:    Chief Complaint   Patient presents with   [facility] Annual Wellness Visit            Vitals:    21 1436   BP: 109/75   Pulse: 84   Resp: 16   Temp: 98.1 °F (36.7 °C)   TempSrc: Temporal   SpO2: 98%   Weight: 116 lb 3.2 oz (52.7 kg)   Height: 5' 11\" (1.803 m)   PainSc:   0 - No pain       Is someone accompanying this pt? NO    Is the patient using any DME equipment during OV?   NO    Hearing/Vision:    Hearing Screening    125Hz 250Hz 500Hz 1000Hz 2000Hz 3000Hz 4000Hz 6000Hz 8000Hz   Right ear:            Left ear:            Vision Screening Comments: Pt is blind     Learning Assessment:     Learning Assessment 2018   PRIMARY LEARNER Patient   HIGHEST LEVEL OF EDUCATION - PRIMARY LEARNER  GRADUATED HIGH SCHOOL OR GED   BARRIERS PRIMARY LEARNER VISUAL   CO-LEARNER CAREGIVER Yes   CO-LEARNER HIGHEST LEVEL OF EDUCATION GRADUATED HIGH SCHOOL OR GED   PRIMARY LANGUAGE ENGLISH   LEARNER PREFERENCE PRIMARY LISTENING   ANSWERED BY Christianne PRECIADO OTHER     Depression Screening:     3 most recent PHQ Screens 2021   Little interest or pleasure in doing things Not at all   Feeling down, depressed, irritable, or hopeless Not at all   Total Score PHQ 2 0   Trouble falling or staying asleep, or sleeping too much Several days   Feeling tired or having little energy Not at all   Poor appetite, weight loss, or overeating Not at all   Feeling bad about yourself - or that you are a failure or have let yourself or your family down Not at all   Trouble concentrating on things such as school, work, reading, or watching TV Not at all   Moving or speaking so slowly that other people could have noticed; or the opposite being so fidgety that others notice Not at all   Thoughts of being better off dead, or hurting yourself in some way Not at all   PHQ 9 Score 1   How difficult have these problems made it for you to do your work, take care of your home and get along with others Not difficult at all     Fall Risk Assessment:     Fall Risk Assessment, last 12 mths 5/28/2021   Able to walk? Yes   Fall in past 12 months? 0   Do you feel unsteady? 0   Are you worried about falling 0     Coordination of Care Questionaire:   1. Have you been to the ER, urgent care clinic since your last visit? Hospitalized since your last visit? NO    2. Have you seen or consulted any other health care providers outside of the [address] [place] [address] since your last visit? Include any pap smears or colon screening. NO    Advanced Directive:   1. Do you have an Advanced Directive? YES    2. Would you like information on Advanced Directives?  NO

## 2021-05-28 NOTE — PROGRESS NOTES
(AWV) The Initial Medicare Annual Wellness Exam PROGRESS NOTE    This is an Initial Medicare Annual Wellness Exam (AWV) (Performed 12 months after IPPE or effective date of Medicare Part B enrollment, Once in a lifetime)    I have reviewed the patient's medical history in detail and updated the computerized patient record. Franki Meehan is a 63 y.o.  male and presents for an annual wellness exam   He is accompanied by his cousin as usual  No acute complaints Lost   7 lbs since last visit as per patient and cousin he has good appetite he eats 3 meals per day        t        Patient Active Problem List   Diagnosis Code    Insomnia G47.00    Mixed hyperlipidemia E78.2    Legally blind H54.8    Essential hypertension I10     Patient Active Problem List    Diagnosis Date Noted    Insomnia 04/25/2018    Mixed hyperlipidemia 04/25/2018    Legally blind 04/25/2018    Essential hypertension 04/25/2018     Current Outpatient Medications   Medication Sig Dispense Refill    atorvastatin (LIPITOR) 40 mg tablet Take 1 Tablet by mouth daily for 90 days. 90 Tablet 2    montelukast (SINGULAIR) 10 mg tablet Take 1 tablet by mouth once daily 90 Tablet 2    ergocalciferol (ERGOCALCIFEROL) 1,250 mcg (50,000 unit) capsule Take 1 Capsule by mouth every seven (7) days for 90 days. 12 Capsule 3    traZODone (DESYREL) 100 mg tablet Take 1 Tablet by mouth nightly for 90 days.  90 Tablet 3    losartan (COZAAR) 25 mg tablet Take 1 tablet by mouth once daily 90 Tab 2    Iron 325 mg (65 mg iron) tablet TAKE 1 TABLET BY MOUTH ONCE DAILY BEFORE BREAKFAST FOR 90 DAYS 90 Tab 0     No Known Allergies  Past Medical History:   Diagnosis Date    Blindness 1996    Glaucoma     Hypercholesterolemia     Hypertension     Tremors of nervous system      Past Surgical History:   Procedure Laterality Date    COLONOSCOPY N/A 7/26/2018    COLONOSCOPY performed by Jonathan Slater MD at [address] [place] [address] No family history on file. Social History     Tobacco Use    Smoking status: Former Smoker     Quit date: 1/1/2018     Years since quitting: 3.4    Smokeless tobacco: Never Used   Substance Use Topics    Alcohol use: No       ROS   History obtained from the patient  and care giver his cousin   General ROS: negative for - chills, fatigue, fever or malaise  Psychological ROS: negative for - anxiety, behavioral disorder or depression  Ophthalmic ROS: positive for - loss of vision  ENT ROS: negative for - epistaxis, headaches or hearing change  Allergy and Immunology ROS: negative for - hives, itchy/watery eyes or nasal congestion  Hematological and Lymphatic ROS: negative for - bleeding problems, blood clots or blood transfusions      Respiratory ROS: no cough, shortness of breath, or wheezing  Cardiovascular ROS: no chest pain or dyspnea on exertion  Gastrointestinal ROS: no abdominal pain, change in bowel habits, or black or bloody stools  Genito-Urinary ROS: positive for - urinary frequency/urgency  negative for - change in urinary stream or dysuria  Musculoskeletal ROS: negative for - joint pain, joint stiffness or joint swelling  Neurological ROS: no TIA or stroke symptoms  Dermatological ROS: negative for - lumps, mole changes or pruritus    . History     Past Medical History:   Diagnosis Date    Blindness 58    Glaucoma     Hypercholesterolemia     Hypertension     Tremors of nervous system       Past Surgical History:   Procedure Laterality Date    COLONOSCOPY N/A 7/26/2018    [facility] ENDOSCOPY    HX HIP REPLACEMENT       Current Outpatient Medications   Medication Sig Dispense Refill    atorvastatin (LIPITOR) 40 mg tablet Take 1 Tablet by mouth daily for 90 days.  90 Tablet 2    montelukast (SINGULAIR) 10 mg tablet Take 1 tablet by mouth once daily 90 Tablet 2    ergocalciferol (ERGOCALCIFEROL) 1,250 mcg (50,000 unit) capsule Take 1 Capsule by mouth every seven (7) days for 90 days. 12 Capsule 3    traZODone (DESYREL) 100 mg tablet Take 1 Tablet by mouth nightly for 90 days. 90 Tablet 3    losartan (COZAAR) 25 mg tablet Take 1 tablet by mouth once daily 90 Tab 2    Iron 325 mg (65 mg iron) tablet TAKE 1 TABLET BY MOUTH ONCE DAILY BEFORE BREAKFAST FOR 90 DAYS 90 Tab 0     No Known Allergies  No family history on file. Social History     Tobacco Use    Smoking status: Former Smoker     Quit date: 1/1/2018     Years since quitting: 3.4    Smokeless tobacco: Never Used   Substance Use Topics    Alcohol use: No     Patient Active Problem List   Diagnosis Code    Insomnia G47.00    Mixed hyperlipidemia E78.2    Legally blind H54.8    Essential hypertension I10       Health Maintenance History  Immunizations he is due   For  dtap  , , zoster  Colonoscopy:up to date   Chest CT:  He quit  Smoking 6 years ago he smoked since age of 17           Depression Risk Factor Screening:      Patient Health Questionnaire (PHQ-2)   Over the last 2 weeks, how often have you been bothered by any of the following problems? · Little interest or pleasure in doing things? · Not at all. [0]  · Feeling down, depressed, or hopeless? · Not at all. [0]    Total Score: 0/6  PHQ-2 Assessment Scoring:   A score of 2 or more requires further screening with the PHQ-9    Alcohol Risk Factor Screening:     Women: On any occasion during the past 3 months, have you had more than 3 drinks containing alcohol? Do you average more than 7 drinks per week? Men: On any occasion during the past 3 months, have you had more than 4 drinks containing alcohol? No   Do you average more than 14 drinks per week? No     Functional Ability and Level of Safety:     Hearing Loss    Hearing is good. Activities of Daily Living   Partial assistance.    Requires assistance with: bathing and hygiene, feeding, grooming, toileting, dressing and no ADLs    Fall Risk   Secondary diagnoses (15 pts)    Abuse Screen   Patient is not abused  None    Examination   Physical Examination  Vitals:    05/28/21 1436   BP: 109/75   Pulse: 84   Resp: 16   Temp: 98.1 °F (36.7 °C)   TempSrc: Temporal   SpO2: 98%   Weight: 116 lb 3.2 oz (52.7 kg)   Height: 5' 11\" (1.803 m)   PainSc:   0 - No pain      Body mass index is 16.21 kg/m². Evaluation of Cognitive Function:  Mood/affect:Good   Appearance:well groomed   Family member/caregiver input:he stating  that he has been doing well eating 3 meals and finishing his food     alert, well appearing, and in no distress, oriented to person, place, and time and he is loosing weight     Patient Care Team:  Carlos Yap MD as PCP - General (Internal Medicine)  Carlos Yap MD as PCP - [address] [place] [address] Dr Braden Provider    End-of-life planning  Advanced Directive in the case than an injury or illness causes the patient to be unable to make health care decisions    Health Care Directive or Living Will: no    Advice/Referrals/Counselling/Plan:   Education and counseling provided:  Are appropriate based on today's review and evaluation  Prostate cancer screening tests (PSA, covered annually)  Colorectal cancer screening tests  Include in education list (weight loss, physical activity, smoking cessation, fall prevention, and nutrition)    ICD-10-CM ICD-9-CM    1. Medicare annual wellness visit, subsequent  Z00.00 V70.0    2. Essential hypertension  F67 142.3 METABOLIC PANEL, COMPREHENSIVE   3. Mixed hyperlipidemia  L96.9 955.8 METABOLIC PANEL, COMPREHENSIVE      atorvastatin (LIPITOR) 40 mg tablet   4. Other iron deficiency anemia  D50.8 280.8 FERRITIN      IRON PROFILE      CBC WITH AUTOMATED DIFF   5. Legally blind  H54.8 369.4 AMB SUPPLY ORDER   6. Allergic rhinitis, unspecified seasonality, unspecified trigger  J30.9 477.9 montelukast (SINGULAIR) 10 mg tablet   7. Vitamin D deficiency  E55.9 268.9 ergocalciferol (ERGOCALCIFEROL) 1,250 mcg (50,000 unit) capsule   8.  Insomnia, unspecified type  G47.00 780.52 traZODone (DESYREL) 100 mg tablet   9. Smoking greater than 30 pack years  F17.210 305.1 CT LOW DOSE LUNG CANCER SCREENING   10. Chronic cough  R05 786.2 XR CHEST PA LAT   11. Weight loss  R63.4 783.21      Encounter Diagnoses   Name Primary?  Medicare annual wellness visit, subsequent Yes    Essential hypertension     Mixed hyperlipidemia     Other iron deficiency anemia     Legally blind     Allergic rhinitis, unspecified seasonality, unspecified trigger     Vitamin D deficiency     Insomnia, unspecified type     Smoking greater than 30 pack years     Chronic cough     Weight loss      Orders Placed This Encounter    AMB SUPPLY ORDER    CT LOW DOSE LUNG CANCER SCREENING    XR CHEST PA LAT    METABOLIC PANEL, COMPREHENSIVE    FERRITIN    IRON PROFILE    CBC WITH AUTOMATED DIFF    atorvastatin (LIPITOR) 40 mg tablet    montelukast (SINGULAIR) 10 mg tablet    ergocalciferol (ERGOCALCIFEROL) 1,250 mcg (50,000 unit) capsule    traZODone (DESYREL) 100 mg tablet     Orders Placed This Encounter    AMB SUPPLY ORDER     He is legally blind need Blind cane    CT LOW DOSE LUNG CANCER SCREENING     Standing Status:   Future     Standing Expiration Date:   5/29/2022     Scheduling Instructions:      Renuka Wilhelm     Order Specific Question:   Is this a low dose CT or a routine CT? Answer:   Low Dose CT [1]     Order Specific Question:   Reason for exam     Answer:   Lung Cancer Screening     Order Specific Question:   Smoking Status   **NOTE: \"Never Smoker\" does not meet the CMS Guidelines for Lung Cancer Screening CT-study may not be covered by insurance     Answer: Former Smoker [4]     Order Specific Question:   Number of years since quit? Answer:   5     Order Specific Question:   Smoking history; number of pack-years     Answer:   30     Order Specific Question:   The patient age is 50-69 years. **NOTE: If \"NO\" this study may not be covered by insurance. Answer:   Yes     Order Specific Question:   Does the patient show any signs or symptoms of lung cancer? Answer:   No     Order Specific Question:   Is this the first (baseline) CT or an annual exam?     Answer:   Baseline [1]     Order Specific Question:   Is there documentation of shared decision making? Answer:   Yes     Order Specific Question:   The patient was informed of the importance of adherence to annual screening, impact of comorbidities and ability/willingness to undergo diagnosis and treatment     Answer:   Yes     Order Specific Question:   The patient was informed of the importance of smoking cessation and/or maintaining smoking abstinence, including the offer of Medicare-covered tobacco cessation counseling services, if applicable     Answer: Yes    XR CHEST PA LAT     Standing Status:   Future     Number of Occurrences:   1     Standing Expiration Date:   7/49/8654    METABOLIC PANEL, COMPREHENSIVE     Standing Status:   Future     Number of Occurrences:   1     Standing Expiration Date:   5/29/2022    FERRITIN     Standing Status:   Future     Number of Occurrences:   1     Standing Expiration Date:   5/29/2022    IRON PROFILE     Standing Status:   Future     Number of Occurrences:   1     Standing Expiration Date:   5/29/2022    CBC WITH AUTOMATED DIFF     Standing Status:   Future     Number of Occurrences:   1     Standing Expiration Date:   5/29/2022    atorvastatin (LIPITOR) 40 mg tablet     Sig: Take 1 Tablet by mouth daily for 90 days. Dispense:  90 Tablet     Refill:  2    montelukast (SINGULAIR) 10 mg tablet     Sig: Take 1 tablet by mouth once daily     Dispense:  90 Tablet     Refill:  2    ergocalciferol (ERGOCALCIFEROL) 1,250 mcg (50,000 unit) capsule     Sig: Take 1 Capsule by mouth every seven (7) days for 90 days. Dispense:  12 Capsule     Refill:  3    traZODone (DESYREL) 100 mg tablet     Sig: Take 1 Tablet by mouth nightly for 90 days.      Dispense: 90 Tablet     Refill:  3     Orders Placed This Encounter    AMB SUPPLY ORDER    CT LOW DOSE LUNG CANCER SCREENING    XR CHEST PA LAT    METABOLIC PANEL, COMPREHENSIVE    FERRITIN    IRON PROFILE    CBC WITH AUTOMATED DIFF    atorvastatin (LIPITOR) 40 mg tablet    montelukast (SINGULAIR) 10 mg tablet    ergocalciferol (ERGOCALCIFEROL) 1,250 mcg (50,000 unit) capsule    traZODone (DESYREL) 100 mg tablet     Diagnoses and all orders for this visit:    1. Medicare annual wellness visit, subsequent    2. Essential hypertension  -     METABOLIC PANEL, COMPREHENSIVE; Future    3. Mixed hyperlipidemia  -     METABOLIC PANEL, COMPREHENSIVE; Future  -     atorvastatin (LIPITOR) 40 mg tablet; Take 1 Tablet by mouth daily for 90 days. 4. Other iron deficiency anemia  -     FERRITIN; Future  -     IRON PROFILE; Future  -     CBC WITH AUTOMATED DIFF; Future    5. Legally blind  -     AMB SUPPLY ORDER    6. Allergic rhinitis, unspecified seasonality, unspecified trigger  -     montelukast (SINGULAIR) 10 mg tablet; Take 1 tablet by mouth once daily    7. Vitamin D deficiency  -     ergocalciferol (ERGOCALCIFEROL) 1,250 mcg (50,000 unit) capsule; Take 1 Capsule by mouth every seven (7) days for 90 days. 8. Insomnia, unspecified type  -     traZODone (DESYREL) 100 mg tablet; Take 1 Tablet by mouth nightly for 90 days. 9. Smoking greater than 30 pack years  -     CT LOW DOSE LUNG CANCER SCREENING; Future    10. Chronic cough  -     XR CHEST PA LAT; Future    11. Weight loss    monitor weight loss and appetite    await blood results and CXR     . Brief written plan, checklist    I have discussed the diagnosis with the patient and the intended plan as seen in the above orders. The patient has received an after-visit summary and questions were answered concerning future plans. I have discussed medication side effects and warnings with the patient as well. I have reviewed the plan of care with the patient, accepted their input and they are in agreement with the treatment goals.                ____________________________________________________________    Problem Assessment    for treatment of   Chief Complaint   Patient presents with   [facility] Wellness Visit

## 2021-05-29 LAB
A-G RATIO,AGRAT: 0.8 RATIO (ref 1.1–2.6)
ABSOLUTE LYMPHOCYTE COUNT, 10803: 2.4 K/UL (ref 1–4.8)
ALBUMIN SERPL-MCNC: 3.6 G/DL (ref 3.5–5)
ALP SERPL-CCNC: 101 U/L (ref 40–125)
ALT SERPL-CCNC: 17 U/L (ref 5–40)
ANION GAP SERPL CALC-SCNC: 12 MMOL/L (ref 3–15)
AST SERPL W P-5'-P-CCNC: 20 U/L (ref 10–37)
BASOPHILS # BLD: 0.1 K/UL (ref 0–0.2)
BASOPHILS NFR BLD: 1 % (ref 0–2)
BILIRUB SERPL-MCNC: 0.3 MG/DL (ref 0.2–1.2)
BUN SERPL-MCNC: 20 MG/DL (ref 6–22)
CALCIUM SERPL-MCNC: 8.9 MG/DL (ref 8.4–10.5)
CHLORIDE SERPL-SCNC: 98 MMOL/L (ref 98–110)
CO2 SERPL-SCNC: 26 MMOL/L (ref 20–32)
CREAT SERPL-MCNC: 1 MG/DL (ref 0.8–1.6)
EOSINOPHIL # BLD: 0.1 K/UL (ref 0–0.5)
EOSINOPHIL NFR BLD: 1 % (ref 0–6)
ERYTHROCYTE [DISTWIDTH] IN BLOOD BY AUTOMATED COUNT: 15.9 % (ref 10–15.5)
FE % SATURATION,PSAT: 8 % (ref 20–50)
FERRITIN SERPL-MCNC: 151 NG/ML (ref 22–322)
GFRAA, 66117: >60
GFRNA, 66118: >60
GLOBULIN,GLOB: 4.7 G/DL (ref 2–4)
GLUCOSE SERPL-MCNC: 85 MG/DL (ref 70–99)
GRANULOCYTES,GRANS: 67 % (ref 40–75)
HCT VFR BLD AUTO: 34.8 % (ref 39.3–51.6)
HGB BLD-MCNC: 10.6 G/DL (ref 13.1–17.2)
IRON,IRN: 21 MCG/DL (ref 45–160)
LYMPHOCYTES, LYMLT: 24 % (ref 20–45)
MCH RBC QN AUTO: 27 PG (ref 26–34)
MCHC RBC AUTO-ENTMCNC: 31 G/DL (ref 31–36)
MCV RBC AUTO: 89 FL (ref 80–95)
MONOCYTES # BLD: 0.8 K/UL (ref 0.1–1)
MONOCYTES NFR BLD: 8 % (ref 3–12)
NEUTROPHILS # BLD AUTO: 6.5 K/UL (ref 1.8–7.7)
PLATELET # BLD AUTO: 356 K/UL (ref 140–440)
PMV BLD AUTO: 9.9 FL (ref 9–13)
POTASSIUM SERPL-SCNC: 4.4 MMOL/L (ref 3.5–5.5)
PROT SERPL-MCNC: 8.3 G/DL (ref 6.2–8.1)
RBC # BLD AUTO: 3.9 M/UL (ref 3.8–5.8)
SODIUM SERPL-SCNC: 136 MMOL/L (ref 133–145)
TIBC,TIBC: 263 MCG/DL (ref 228–428)
UIBC SERPL-MCNC: 242 MCG/DL (ref 110–370)
WBC # BLD AUTO: 9.8 K/UL (ref 4–11)

## 2021-06-05 RX ORDER — LANOLIN ALCOHOL/MO/W.PET/CERES
CREAM (GRAM) TOPICAL
Qty: 90 TABLET | Refills: 1 | Status: SHIPPED | OUTPATIENT
Start: 2021-06-05 | End: 2022-04-07

## 2022-03-18 PROBLEM — G47.00 INSOMNIA: Status: ACTIVE | Noted: 2018-04-25

## 2022-03-18 PROBLEM — E78.2 MIXED HYPERLIPIDEMIA: Status: ACTIVE | Noted: 2018-04-25

## 2022-03-19 PROBLEM — H54.8 LEGALLY BLIND: Status: ACTIVE | Noted: 2018-04-25

## 2022-03-19 PROBLEM — I10 ESSENTIAL HYPERTENSION: Status: ACTIVE | Noted: 2018-04-25

## 2022-04-05 DIAGNOSIS — J30.9 ALLERGIC RHINITIS, UNSPECIFIED SEASONALITY, UNSPECIFIED TRIGGER: ICD-10-CM

## 2022-04-05 DIAGNOSIS — D50.8 OTHER IRON DEFICIENCY ANEMIA: ICD-10-CM

## 2022-04-05 RX ORDER — MONTELUKAST SODIUM 10 MG/1
TABLET ORAL
Qty: 90 TABLET | Refills: 0 | Status: CANCELLED | OUTPATIENT
Start: 2022-04-05

## 2022-04-07 RX ORDER — LANOLIN ALCOHOL/MO/W.PET/CERES
CREAM (GRAM) TOPICAL
Qty: 90 TABLET | Refills: 0 | Status: SHIPPED | OUTPATIENT
Start: 2022-04-07 | End: 2022-06-07 | Stop reason: SDUPTHER

## 2022-04-07 RX ORDER — MONTELUKAST SODIUM 10 MG/1
TABLET ORAL
Qty: 90 TABLET | Refills: 0 | Status: SHIPPED | OUTPATIENT
Start: 2022-04-07 | End: 2022-07-23

## 2022-04-07 NOTE — TELEPHONE ENCOUNTER
Last visit: 5/28/21  Next visit: No future appointments.   Last filled:    6/5/21; iron tab; qty 90 w/1 refill  5/28/21; singulair 10 mg tab; qty 90 w/2 refills

## 2022-05-09 DIAGNOSIS — E78.2 MIXED HYPERLIPIDEMIA: ICD-10-CM

## 2022-05-09 DIAGNOSIS — I10 ESSENTIAL HYPERTENSION: ICD-10-CM

## 2022-05-09 RX ORDER — ATORVASTATIN CALCIUM 40 MG/1
40 TABLET, FILM COATED ORAL DAILY
Qty: 90 TABLET | Refills: 2 | Status: SHIPPED | OUTPATIENT
Start: 2022-05-09 | End: 2022-08-07

## 2022-05-09 RX ORDER — LOSARTAN POTASSIUM 25 MG/1
TABLET ORAL
Qty: 90 TABLET | Refills: 2 | Status: SHIPPED | OUTPATIENT
Start: 2022-05-09

## 2022-06-06 ENCOUNTER — OFFICE VISIT (OUTPATIENT)
Dept: FAMILY MEDICINE CLINIC | Age: 67
End: 2022-06-06

## 2022-06-06 ENCOUNTER — LAB ONLY (OUTPATIENT)
Dept: FAMILY MEDICINE CLINIC | Age: 67
End: 2022-06-06

## 2022-06-06 VITALS
BODY MASS INDEX: 16.94 KG/M2 | RESPIRATION RATE: 14 BRPM | WEIGHT: 121 LBS | DIASTOLIC BLOOD PRESSURE: 78 MMHG | HEART RATE: 99 BPM | SYSTOLIC BLOOD PRESSURE: 126 MMHG | HEIGHT: 71 IN | OXYGEN SATURATION: 100 % | TEMPERATURE: 98 F

## 2022-06-06 DIAGNOSIS — E53.8 VITAMIN B12 DEFICIENCY: ICD-10-CM

## 2022-06-06 DIAGNOSIS — R79.0 LOW MAGNESIUM LEVEL: ICD-10-CM

## 2022-06-06 DIAGNOSIS — D50.8 OTHER IRON DEFICIENCY ANEMIA: ICD-10-CM

## 2022-06-06 DIAGNOSIS — E78.2 MIXED HYPERLIPIDEMIA: ICD-10-CM

## 2022-06-06 DIAGNOSIS — Z00.00 MEDICARE ANNUAL WELLNESS VISIT, SUBSEQUENT: ICD-10-CM

## 2022-06-06 DIAGNOSIS — Z13.1 SCREENING FOR DIABETES MELLITUS (DM): ICD-10-CM

## 2022-06-06 DIAGNOSIS — E55.9 VITAMIN D DEFICIENCY: ICD-10-CM

## 2022-06-06 DIAGNOSIS — I10 ESSENTIAL HYPERTENSION: ICD-10-CM

## 2022-06-06 DIAGNOSIS — G47.00 INSOMNIA, UNSPECIFIED TYPE: ICD-10-CM

## 2022-06-06 DIAGNOSIS — H54.8 LEGALLY BLIND: ICD-10-CM

## 2022-06-06 DIAGNOSIS — L85.3 DRY SKIN DERMATITIS: ICD-10-CM

## 2022-06-06 DIAGNOSIS — Z00.00 MEDICARE ANNUAL WELLNESS VISIT, SUBSEQUENT: Primary | ICD-10-CM

## 2022-06-06 PROCEDURE — G8432 DEP SCR NOT DOC, RNG: HCPCS | Performed by: LEGAL MEDICINE

## 2022-06-06 PROCEDURE — G8419 CALC BMI OUT NRM PARAM NOF/U: HCPCS | Performed by: LEGAL MEDICINE

## 2022-06-06 PROCEDURE — G8752 SYS BP LESS 140: HCPCS | Performed by: LEGAL MEDICINE

## 2022-06-06 PROCEDURE — 3017F COLORECTAL CA SCREEN DOC REV: CPT | Performed by: LEGAL MEDICINE

## 2022-06-06 PROCEDURE — G0439 PPPS, SUBSEQ VISIT: HCPCS | Performed by: LEGAL MEDICINE

## 2022-06-06 PROCEDURE — G8427 DOCREV CUR MEDS BY ELIG CLIN: HCPCS | Performed by: LEGAL MEDICINE

## 2022-06-06 PROCEDURE — G8754 DIAS BP LESS 90: HCPCS | Performed by: LEGAL MEDICINE

## 2022-06-06 RX ORDER — TRAZODONE HYDROCHLORIDE 100 MG/1
100 TABLET ORAL
Qty: 90 TABLET | Refills: 3 | Status: SHIPPED | OUTPATIENT
Start: 2022-06-06 | End: 2022-09-04

## 2022-06-06 RX ORDER — MOMETASONE FUROATE 1 MG/G
CREAM TOPICAL
Qty: 50 G | Refills: 0 | Status: SHIPPED | OUTPATIENT
Start: 2022-06-06 | End: 2022-07-12

## 2022-06-06 NOTE — PROGRESS NOTES
(AWV) The Initial Medicare Annual Wellness Exam PROGRESS NOTE    This is an Initial Medicare Annual Wellness Exam (AWV) (Performed 12 months after IPPE or effective date of Medicare Part B enrollment, Once in a lifetime)    I have reviewed the patient's medical history in detail and updated the computerized patient record. Ivon Little is a 61 y.o.  male and presents for an annual wellness exam   He has been complaining about generalized body itching dry skin   He is accompanied as usual by his cousin and his power of       Patient Active Problem List   Diagnosis Code    Insomnia G47.00    Mixed hyperlipidemia E78.2    Legally blind H54.8    Essential hypertension I10     Patient Active Problem List    Diagnosis Date Noted    Insomnia 04/25/2018    Mixed hyperlipidemia 04/25/2018    Legally blind 04/25/2018    Essential hypertension 04/25/2018     Current Outpatient Medications   Medication Sig Dispense Refill    traZODone (DESYREL) 100 mg tablet Take 1 Tablet by mouth nightly for 90 days. 90 Tablet 3    mometasone (ELOCON) 0.1 % topical cream 2 g thin layer as needed  For 5 days at a time 50 g 0    atorvastatin (LIPITOR) 40 mg tablet Take 1 Tablet by mouth daily for 90 days. 90 Tablet 2    losartan (COZAAR) 25 mg tablet Take 1 tablet by mouth once daily 90 Tablet 2    ferrous sulfate (Iron, Ferrous Sulfate,) 325 mg (65 mg iron) tablet TAKE 1 TABLET BY MOUTH ONCE DAILY BEFORE BREAKFAST 90 Tablet 0    montelukast (SINGULAIR) 10 mg tablet Take 1 tablet by mouth once daily 90 Tablet 0    ergocalciferol (ERGOCALCIFEROL) 1,250 mcg (50,000 unit) capsule Take 1 Capsule by mouth every seven (7) days for 90 days.  12 Capsule 3     No Known Allergies  Past Medical History:   Diagnosis Date    Blindness 1996    Glaucoma     Hypercholesterolemia     Hypertension     Tremors of nervous system      Past Surgical History:   Procedure Laterality Date    COLONOSCOPY N/A 7/26/2018    COLONOSCOPY performed by Walter Sauceda MD at [facility] ENDOSCOPY    HX HIP REPLACEMENT       No family history on file. Social History     Tobacco Use    Smoking status: Former Smoker     Quit date: 2018     Years since quittin.4    Smokeless tobacco: Never Used   Substance Use Topics    Alcohol use: No           ROS   History obtained from the patient  and care giver his cousin   General ROS: negative for - chills, fatigue, fever or malaise  Psychological ROS: negative for - anxiety, behavioral disorder or depression  Ophthalmic ROS: positive for - loss of vision  ENT ROS: negative for - epistaxis, headaches or hearing change  Allergy and Immunology ROS: negative for - hives, itchy/watery eyes or nasal congestion  Hematological and Lymphatic ROS: negative for - bleeding problems, blood clots or blood transfusions        Respiratory ROS: no cough, shortness of breath, or wheezing  Cardiovascular ROS: no chest pain or dyspnea on exertion  Gastrointestinal ROS: no abdominal pain, change in bowel habits, or black or bloody stools  Genito-Urinary ROS: positive for - urinary frequency/urgency  negative for - change in urinary stream or dysuria  Musculoskeletal ROS: negative for - joint pain, joint stiffness or joint swelling  Neurological ROS: no TIA or stroke symptoms  Dermatological ROS: negative for - lumps, mole changes   Dry skin and itching         History     Past Medical History:   Diagnosis Date    Blindness     Glaucoma     Hypercholesterolemia     Hypertension     Tremors of nervous system       Past Surgical History:   Procedure Laterality Date    COLONOSCOPY N/A 2018    COLONOSCOPY performed by Walter Sauceda MD at [facility] ENDOSCOPY    HX HIP REPLACEMENT       Current Outpatient Medications   Medication Sig Dispense Refill    traZODone (DESYREL) 100 mg tablet Take 1 Tablet by mouth nightly for 90 days.  90 Tablet 3    mometasone (ELOCON) 0.1 % topical cream 2 g thin layer as needed  For 5 days at a time 50 g 0    atorvastatin (LIPITOR) 40 mg tablet Take 1 Tablet by mouth daily for 90 days. 90 Tablet 2    losartan (COZAAR) 25 mg tablet Take 1 tablet by mouth once daily 90 Tablet 2    ferrous sulfate (Iron, Ferrous Sulfate,) 325 mg (65 mg iron) tablet TAKE 1 TABLET BY MOUTH ONCE DAILY BEFORE BREAKFAST 90 Tablet 0    montelukast (SINGULAIR) 10 mg tablet Take 1 tablet by mouth once daily 90 Tablet 0    ergocalciferol (ERGOCALCIFEROL) 1,250 mcg (50,000 unit) capsule Take 1 Capsule by mouth every seven (7) days for 90 days. 12 Capsule 3     No Known Allergies  No family history on file. Social History     Tobacco Use    Smoking status: Former Smoker     Quit date: 2018     Years since quittin.4    Smokeless tobacco: Never Used   Substance Use Topics    Alcohol use: No     Patient Active Problem List   Diagnosis Code    Insomnia G47.00    Mixed hyperlipidemia E78.2    Legally blind H54.8    Essential hypertension I10       Health Maintenance History  I  Immunizations he is due   For  dtap  , , zoster  Colonoscopy:up to date   Chest CT:  He quit  Smoking 6 years ago he smoked since age of 17                 Depression Risk Factor Screening:      Patient Health Questionnaire (PHQ-2)   Over the last 2 weeks, how often have you been bothered by any of the following problems? Little interest or pleasure in doing things? Not at all. [0]  Feeling down, depressed, or hopeless? Not at all. [0]    Total Score: 0/6  PHQ-2 Assessment Scoring:   A score of 2 or more requires further screening with the PHQ-9    Alcohol Risk Factor Screening:     Women: On any occasion during the past 3 months, have you had more than 3 drinks containing alcohol? No    Do you average more than 7 drinks per week? Men: On any occasion during the past 3 months, have you had more than 4 drinks containing alcohol? No  Do you average more than 14 drinks per week? Functional Ability and Level of Safety:     Hearing Loss    Hearing is good.     Activities of Daily Living   Partial assistance. Requires assistance with: no ADLs    Fall Risk   Secondary diagnoses (15 pts)    Abuse Screen   Patient is not abused  None    Examination   Physical Examination  Vitals:    06/06/22 1530   BP: 126/78   Pulse: 99   Resp: 14   Temp: 98 °F (36.7 °C)   TempSrc: Temporal   SpO2: 100%   Weight: 121 lb (54.9 kg)   Height: 5' 11\" (1.803 m)   PainSc:   0 - No pain      Body mass index is 16.88 kg/m². Evaluation of Cognitive Function:  Mood/affect:Nutral   Appearance:well groomed   Family member/caregiver input:accompanied by his cousin Chad Pineda     alert, well appearing, and in no distress, oriented to person, place, and time and normal appearing weight    Patient Care Team:  Colt Goodwin MD as PCP - General (Internal Medicine Physician)  Colt Goodwin MD as PCP - [address] [place] [address] Dr Robertsonled Provider    End-of-life planning  Advanced Directive in the case than an injury or illness causes the patient to be unable to make health care decisions    Health Care Directive or Living Will: yes    Advice/Referrals/Counselling/Plan:   Education and counseling provided:  Are appropriate based on today's review and evaluation  Pneumococcal Vaccine  Prostate cancer screening tests (PSA, covered annually)  Colorectal cancer screening tests  Include in education list (weight loss, physical activity, smoking cessation, fall prevention, and nutrition)    ICD-10-CM ICD-9-CM    1. Medicare annual wellness visit, subsequent  Z00.00 V70.0 CBC WITH AUTOMATED DIFF   2. Insomnia, unspecified type  G47.00 780.52 traZODone (DESYREL) 100 mg tablet   3. Legally blind  H54.8 369.4    4. Essential hypertension  I10 401.9 LIPID PANEL      METABOLIC PANEL, COMPREHENSIVE   5. Other iron deficiency anemia  D50.8 280.8 CBC WITH AUTOMATED DIFF      IRON PROFILE   6. Vitamin D deficiency  E55.9 268.9 VITAMIN D, 25 HYDROXY   7. Mixed hyperlipidemia  E78.2 272.2 LIPID PANEL   8.  Dry skin dermatitis  L85.3 692.89 mometasone (ELOCON) 0.1 % topical cream   9. Screening for diabetes mellitus (DM)  Z13.1 V77.1 HEMOGLOBIN A1C W/O EAG   10. Low magnesium level  R79.0 790.6 MAGNESIUM   11. Vitamin B12 deficiency  E53.8 266.2 VITAMIN B12     Encounter Diagnoses   Name Primary?     Medicare annual wellness visit, subsequent Yes    Insomnia, unspecified type     Legally blind     Essential hypertension     Other iron deficiency anemia     Vitamin D deficiency     Mixed hyperlipidemia     Dry skin dermatitis     Screening for diabetes mellitus (DM)     Low magnesium level     Vitamin B12 deficiency      Orders Placed This Encounter    LIPID PANEL    METABOLIC PANEL, COMPREHENSIVE    CBC WITH AUTOMATED DIFF    VITAMIN D, 25 HYDROXY    IRON PROFILE    HEMOGLOBIN A1C W/O EAG    MAGNESIUM    VITAMIN B12    traZODone (DESYREL) 100 mg tablet    mometasone (ELOCON) 0.1 % topical cream     Orders Placed This Encounter    LIPID PANEL     Standing Status:   Future     Number of Occurrences:   1     Standing Expiration Date:   9/1/1102    METABOLIC PANEL, COMPREHENSIVE     Standing Status:   Future     Number of Occurrences:   1     Standing Expiration Date:   6/7/2023    CBC WITH AUTOMATED DIFF     Standing Status:   Future     Number of Occurrences:   1     Standing Expiration Date:   6/7/2023    VITAMIN D, 25 HYDROXY     Standing Status:   Future     Number of Occurrences:   1     Standing Expiration Date:   6/7/2023    IRON PROFILE     Standing Status:   Future     Number of Occurrences:   1     Standing Expiration Date:   6/7/2023    HEMOGLOBIN A1C W/O EAG     Standing Status:   Future     Number of Occurrences:   1     Standing Expiration Date:   6/7/2023    MAGNESIUM     Standing Status:   Future     Number of Occurrences:   1     Standing Expiration Date:   6/7/2023    VITAMIN B12     Standing Status:   Future     Number of Occurrences:   1     Standing Expiration Date:   6/7/2023    traZODone (DESYREL) 100 mg tablet     Sig: Take 1 Tablet by mouth nightly for 90 days. Dispense:  90 Tablet     Refill:  3    mometasone (ELOCON) 0.1 % topical cream     Si g thin layer as needed  For 5 days at a time     Dispense:  50 g     Refill:  0     Orders Placed This Encounter    LIPID PANEL    METABOLIC PANEL, COMPREHENSIVE    CBC WITH AUTOMATED DIFF    VITAMIN D, 25 HYDROXY    IRON PROFILE    HEMOGLOBIN A1C W/O EAG    MAGNESIUM    VITAMIN B12    traZODone (DESYREL) 100 mg tablet    mometasone (ELOCON) 0.1 % topical cream     Diagnoses and all orders for this visit:    1. Medicare annual wellness visit, subsequent  -     CBC WITH AUTOMATED DIFF; Future    2. Insomnia, unspecified type  -     traZODone (DESYREL) 100 mg tablet; Take 1 Tablet by mouth nightly for 90 days. 3. Legally blind    4. Essential hypertension  Blood pressure well controlled on current medications  -     LIPID PANEL; Future  -     METABOLIC PANEL, COMPREHENSIVE; Future    5. Other iron deficiency anemia  Is currently on iron supplements  -     CBC WITH AUTOMATED DIFF; Future  -     IRON PROFILE; Future    6. Vitamin D deficiency  Is taking weekly vitamin D supplement  -     VITAMIN D, 25 HYDROXY; Future    7. Mixed hyperlipidemia  -     LIPID PANEL; Future    8. Dry skin dermatitis  Possible dermatitis strongly advised about moisturizing skin  Is currently using [employer] cream  -     mometasone (ELOCON) 0.1 % topical cream; 2 g thin layer as needed  For 5 days at a time    9. Screening for diabetes mellitus (DM)    -     HEMOGLOBIN A1C W/O EAG; Future    10. Low magnesium level  -     MAGNESIUM; Future    11. Vitamin B12 deficiency  -     VITAMIN B12; Future   Urge Incontinence of urine  Patient has moderate and frequent urge incontinence    He is legally blind has also had component of functional incontinence        Follow-up and Dispositions    Return in about 6 months (around 2022) for as per results. current treatment plan is effective, no change in therapy.   Brief written plan, checklist    I have discussed the diagnosis with the patient and the intended plan as seen in the above orders. The patient has received an after-visit summary and questions were answered concerning future plans. I have discussed medication side effects and warnings with the patient as well. I have reviewed the plan of care with the patient, accepted their input and they are in agreement with the treatment goals. Follow-up and Dispositions    Return in about 6 months (around 12/6/2022) for as per results.            ____________________________________________________________    Problem Assessment    for treatment of   Chief Complaint   Patient presents with    Annual Wellness Visit

## 2022-06-06 NOTE — PROGRESS NOTES
Jaden Young is a 61 y.o. male (: 1955) presenting to address:    Chief Complaint   Patient presents with    Annual Wellness Visit       Vitals:    22 1530   Resp: 14   Temp: 98 °F (36.7 °C)   TempSrc: Temporal   Weight: 121 lb (54.9 kg)   Height: 5' 11\" (1.803 m)   PainSc:   0 - No pain       Hearing/Vision:   No exam data present    Learning Assessment:     Learning Assessment 2018   PRIMARY LEARNER Patient   HIGHEST LEVEL OF EDUCATION - PRIMARY LEARNER  GRADUATED HIGH SCHOOL OR GED   BARRIERS PRIMARY LEARNER VISUAL   CO-LEARNER CAREGIVER Yes   CO-LEARNER HIGHEST LEVEL OF EDUCATION GRADUATED HIGH SCHOOL OR GED   PRIMARY LANGUAGE ENGLISH   LEARNER PREFERENCE PRIMARY LISTENING   ANSWERED BY Jamison PRECIADO OTHER     Depression Screening:     3 most recent PHQ Screens 2022   Little interest or pleasure in doing things Not at all   Feeling down, depressed, irritable, or hopeless Not at all   Total Score PHQ 2 0   Trouble falling or staying asleep, or sleeping too much -   Feeling tired or having little energy -   Poor appetite, weight loss, or overeating -   Feeling bad about yourself - or that you are a failure or have let yourself or your family down -   Trouble concentrating on things such as school, work, reading, or watching TV -   Moving or speaking so slowly that other people could have noticed; or the opposite being so fidgety that others notice -   Thoughts of being better off dead, or hurting yourself in some way -   PHQ 9 Score -   How difficult have these problems made it for you to do your work, take care of your home and get along with others -     Fall Risk Assessment:     Fall Risk Assessment, last 12 mths 2021   Able to walk? Yes   Fall in past 12 months? 0   Do you feel unsteady? 0   Are you worried about falling 0     Abuse Screening:     Abuse Screening Questionnaire 2022   Do you ever feel afraid of your partner?  N   Are you in a relationship with someone who physically or mentally threatens you? N   Is it safe for you to go home? Y     ADL Assessment:     ADL Assessment 6/6/2022   Feeding yourself No Help Needed   Getting from bed to chair Help Needed   Getting dressed Help Needed   Bathing or showering Help Needed   Walk across the room (includes cane/walker) Help Needed   Using the telphone Help Needed   Taking your medications Help Needed   Preparing meals Help Needed   Managing money (expenses/bills) Help Needed   Moderately strenuous housework (laundry) Help Needed   Shopping for personal items (toiletries/medicines) Help Needed   Shopping for groceries Help Needed   Driving Help Needed   Climbing a flight of stairs Help Needed   Getting to places beyond walking distances Help Needed        Coordination of Care Questionaire:   1. \"Have you been to the ER, urgent care clinic since your last visit? Hospitalized since your last visit? \" No    2. \"Have you seen or consulted any other health care providers outside of the [address] [place] [address] [place] since your last visit? \" No     3. For patients aged 39-70: Has the patient had a colonoscopy? Yes - Care Gap present. Most recent result on file     If the patient is female:    4. For patients aged 41-77: Has the patient had a mammogram within the past 2 years? NA - based on age    11. For patients aged 21-65: Has the patient had a pap smear? NA - based on age    Advanced Directive:   1. Do you have an Advanced Directive? NO    2. Would you like information on Advanced Directives?  NO

## 2022-06-07 LAB
25(OH)D3 SERPL-MCNC: 34.3 NG/ML (ref 32–100)
A-G RATIO,AGRAT: 0.9 RATIO (ref 1.1–2.6)
ABSOLUTE LYMPHOCYTE COUNT, 10803: 3.4 K/UL (ref 1–4.8)
ALBUMIN SERPL-MCNC: 3.8 G/DL (ref 3.5–5)
ALP SERPL-CCNC: 102 U/L (ref 40–125)
ALT SERPL-CCNC: 20 U/L (ref 5–40)
ANION GAP SERPL CALC-SCNC: 13 MMOL/L (ref 3–15)
AST SERPL W P-5'-P-CCNC: 24 U/L (ref 10–37)
AVG GLU, 10930: 112 MG/DL (ref 91–123)
BASOPHILS # BLD: 0 K/UL (ref 0–0.2)
BASOPHILS NFR BLD: 0 % (ref 0–2)
BILIRUB SERPL-MCNC: 0.3 MG/DL (ref 0.2–1.2)
BUN SERPL-MCNC: 19 MG/DL (ref 6–22)
CALCIUM SERPL-MCNC: 9 MG/DL (ref 8.4–10.5)
CHLORIDE SERPL-SCNC: 100 MMOL/L (ref 98–110)
CHOLEST SERPL-MCNC: 128 MG/DL (ref 110–200)
CO2 SERPL-SCNC: 26 MMOL/L (ref 20–32)
CREAT SERPL-MCNC: 1 MG/DL (ref 0.8–1.6)
EOSINOPHIL # BLD: 0.1 K/UL (ref 0–0.5)
EOSINOPHIL NFR BLD: 1 % (ref 0–6)
ERYTHROCYTE [DISTWIDTH] IN BLOOD BY AUTOMATED COUNT: 16.6 % (ref 10–15.5)
FE % SATURATION,PSAT: 18 % (ref 20–50)
GLOBULIN,GLOB: 4.4 G/DL (ref 2–4)
GLOMERULAR FILTRATION RATE: >60 ML/MIN/1.73 SQ.M.
GLUCOSE SERPL-MCNC: 77 MG/DL (ref 70–99)
GRANULOCYTES,GRANS: 57 % (ref 40–75)
HBA1C MFR BLD HPLC: 5.5 % (ref 4.8–5.6)
HCT VFR BLD AUTO: 38.2 % (ref 39.3–51.6)
HDLC SERPL-MCNC: 2.4 MG/DL (ref 0–5)
HDLC SERPL-MCNC: 53 MG/DL
HGB BLD-MCNC: 11.6 G/DL (ref 13.1–17.2)
IRON,IRN: 39 MCG/DL (ref 45–160)
LDL/HDL RATIO,LDHD: 1.1
LDLC SERPL CALC-MCNC: 60 MG/DL (ref 50–99)
LYMPHOCYTES, LYMLT: 31 % (ref 20–45)
MAGNESIUM SERPL-MCNC: 1.9 MG/DL (ref 1.6–2.5)
MCH RBC QN AUTO: 29 PG (ref 26–34)
MCHC RBC AUTO-ENTMCNC: 30 G/DL (ref 31–36)
MCV RBC AUTO: 96 FL (ref 80–95)
MONOCYTES # BLD: 1.1 K/UL (ref 0.1–1)
MONOCYTES NFR BLD: 10 % (ref 3–12)
NEUTROPHILS # BLD AUTO: 6.3 K/UL (ref 1.8–7.7)
NON-HDL CHOLESTEROL, 011976: 75 MG/DL
PLATELET # BLD AUTO: 346 K/UL (ref 140–440)
PMV BLD AUTO: 10.2 FL (ref 9–13)
POTASSIUM SERPL-SCNC: 3.5 MMOL/L (ref 3.5–5.5)
PROT SERPL-MCNC: 8.2 G/DL (ref 6.2–8.1)
RBC # BLD AUTO: 3.99 M/UL (ref 3.8–5.8)
SODIUM SERPL-SCNC: 139 MMOL/L (ref 133–145)
TIBC,TIBC: 215 MCG/DL (ref 228–428)
TRIGL SERPL-MCNC: 77 MG/DL (ref 40–149)
UIBC SERPL-MCNC: 176 MCG/DL (ref 110–370)
VIT B12 SERPL-MCNC: 447 PG/ML (ref 211–911)
VLDLC SERPL CALC-MCNC: 15 MG/DL (ref 8–30)
WBC # BLD AUTO: 11 K/UL (ref 4–11)

## 2022-06-07 RX ORDER — LANOLIN ALCOHOL/MO/W.PET/CERES
CREAM (GRAM) TOPICAL
Qty: 90 TABLET | Refills: 1 | Status: SHIPPED | OUTPATIENT
Start: 2022-06-07

## 2022-06-07 RX ORDER — ERGOCALCIFEROL 1.25 MG/1
50000 CAPSULE ORAL
Qty: 12 CAPSULE | Refills: 3 | Status: SHIPPED | OUTPATIENT
Start: 2022-06-07 | End: 2022-09-05

## 2022-07-12 DIAGNOSIS — L85.3 DRY SKIN DERMATITIS: ICD-10-CM

## 2022-07-12 RX ORDER — MOMETASONE FUROATE 1 MG/G
CREAM TOPICAL
Qty: 50 G | Refills: 0 | Status: SHIPPED | OUTPATIENT
Start: 2022-07-12

## 2022-07-20 DIAGNOSIS — J30.9 ALLERGIC RHINITIS, UNSPECIFIED SEASONALITY, UNSPECIFIED TRIGGER: ICD-10-CM

## 2022-07-23 RX ORDER — MONTELUKAST SODIUM 10 MG/1
TABLET ORAL
Qty: 90 TABLET | Refills: 1 | Status: SHIPPED | OUTPATIENT
Start: 2022-07-23

## 2022-11-29 ENCOUNTER — TELEPHONE (OUTPATIENT)
Dept: FAMILY MEDICINE CLINIC | Age: 67
End: 2022-11-29

## 2022-11-29 NOTE — TELEPHONE ENCOUNTER
FRANNIE Sanchez, RAJESH of home care delivered to rtn call to discuss what further information is needed.

## 2022-11-29 NOTE — TELEPHONE ENCOUNTER
Jono Menendez RN from [address] [place] [address] called on behalf of the pt stating that they need additional info for incontinence supplies for the pt from the visit date on 6/6/22. Jono Menendez stated hat the addendum should state the type of incontinence degree of moderation and the underline cause. Jono Menendez stated that the type can not be unspecific but specific. This addendum has to be done by 12/6/22 for the order to be valid.  Please fax forms to [phone]

## 2022-12-08 ENCOUNTER — TELEPHONE (OUTPATIENT)
Dept: FAMILY MEDICINE CLINIC | Age: 67
End: 2022-12-08

## 2022-12-08 NOTE — TELEPHONE ENCOUNTER
Darline from [address] [place] [address] called needing an addendum done for the patient's incontinence supplies in order for ins to cover it. She states she needs an ov note within the last 6 months to be addended to state the special type of incontinence supplies the patient has, the underlying dx related to the incontinence and the degree of that incontinence (ex; moderate, frequent). Please fax to [phone] .

## 2023-01-22 DIAGNOSIS — J30.9 ALLERGIC RHINITIS, UNSPECIFIED SEASONALITY, UNSPECIFIED TRIGGER: ICD-10-CM

## 2023-01-22 RX ORDER — MONTELUKAST SODIUM 10 MG/1
TABLET ORAL
Qty: 90 TABLET | Refills: 0 | Status: SHIPPED | OUTPATIENT
Start: 2023-01-22

## 2023-01-23 DIAGNOSIS — D50.8 OTHER IRON DEFICIENCY ANEMIA: ICD-10-CM

## 2023-01-23 RX ORDER — LANOLIN ALCOHOL/MO/W.PET/CERES
CREAM (GRAM) TOPICAL
Qty: 90 TABLET | Refills: 0 | Status: SHIPPED | OUTPATIENT
Start: 2023-01-23

## 2023-02-27 RX ORDER — ATORVASTATIN CALCIUM 40 MG/1
TABLET, FILM COATED ORAL
Qty: 90 TABLET | Refills: 0 | Status: SHIPPED | OUTPATIENT
Start: 2023-02-27

## 2023-02-27 NOTE — TELEPHONE ENCOUNTER
Virginia Zimmer called for their medication refill.     Last Office visit:  6/6/2022    Last Filled: 5/9/2022; Qty 90 w/ 2 refills     Follow up visit:    Future Appointments   Date Time Provider Katelynn Bush   3/14/2023  2:45 PM MD OSBALDO Cruz BS AMB

## 2023-03-14 ENCOUNTER — OFFICE VISIT (OUTPATIENT)
Dept: FAMILY MEDICINE CLINIC | Facility: CLINIC | Age: 68
End: 2023-03-14

## 2023-03-14 VITALS
DIASTOLIC BLOOD PRESSURE: 88 MMHG | BODY MASS INDEX: 19.04 KG/M2 | OXYGEN SATURATION: 98 % | TEMPERATURE: 98 F | SYSTOLIC BLOOD PRESSURE: 130 MMHG | RESPIRATION RATE: 14 BRPM | WEIGHT: 136 LBS | HEIGHT: 71 IN | HEART RATE: 83 BPM

## 2023-03-14 DIAGNOSIS — H54.8 LEGAL BLINDNESS, AS DEFINED IN USA: ICD-10-CM

## 2023-03-14 DIAGNOSIS — N50.82 SCROTUM PAIN: ICD-10-CM

## 2023-03-14 DIAGNOSIS — E55.9 VITAMIN D DEFICIENCY, UNSPECIFIED: ICD-10-CM

## 2023-03-14 DIAGNOSIS — N45.3 ORCHITIS AND EPIDIDYMITIS: ICD-10-CM

## 2023-03-14 DIAGNOSIS — R11.0 NAUSEA: ICD-10-CM

## 2023-03-14 DIAGNOSIS — N50.89 SCROTUM SWELLING: Primary | ICD-10-CM

## 2023-03-14 DIAGNOSIS — G47.00 INSOMNIA, UNSPECIFIED TYPE: ICD-10-CM

## 2023-03-14 DIAGNOSIS — D50.8 OTHER IRON DEFICIENCY ANEMIAS: ICD-10-CM

## 2023-03-14 DIAGNOSIS — J30.9 ALLERGIC RHINITIS, UNSPECIFIED SEASONALITY, UNSPECIFIED TRIGGER: ICD-10-CM

## 2023-03-14 DIAGNOSIS — I10 ESSENTIAL (PRIMARY) HYPERTENSION: ICD-10-CM

## 2023-03-14 DIAGNOSIS — E78.2 MIXED HYPERLIPIDEMIA: ICD-10-CM

## 2023-03-14 RX ORDER — ERGOCALCIFEROL 1.25 MG/1
CAPSULE ORAL
Qty: 6 CAPSULE | Refills: 1 | Status: SHIPPED | OUTPATIENT
Start: 2023-03-14

## 2023-03-14 RX ORDER — FERROUS SULFATE 325(65) MG
TABLET ORAL
Qty: 90 TABLET | Refills: 1 | Status: SHIPPED | OUTPATIENT
Start: 2023-03-14

## 2023-03-14 RX ORDER — ONDANSETRON 8 MG/1
TABLET, ORALLY DISINTEGRATING ORAL EVERY 8 HOURS PRN
COMMUNITY
Start: 2022-10-07 | End: 2023-03-14 | Stop reason: SDUPTHER

## 2023-03-14 RX ORDER — CIPROFLOXACIN 500 MG/1
500 TABLET, FILM COATED ORAL 2 TIMES DAILY
Qty: 20 TABLET | Refills: 0 | Status: SHIPPED | OUTPATIENT
Start: 2023-03-14 | End: 2023-03-24

## 2023-03-14 RX ORDER — ONDANSETRON 8 MG/1
8 TABLET, ORALLY DISINTEGRATING ORAL EVERY 8 HOURS PRN
Qty: 30 TABLET | Refills: 3 | Status: SHIPPED | OUTPATIENT
Start: 2023-03-14 | End: 2023-04-13

## 2023-03-14 RX ORDER — MONTELUKAST SODIUM 10 MG/1
TABLET ORAL
Qty: 90 TABLET | Refills: 3 | Status: SHIPPED | OUTPATIENT
Start: 2023-03-14

## 2023-03-14 RX ORDER — ATORVASTATIN CALCIUM 40 MG/1
TABLET, FILM COATED ORAL
Qty: 90 TABLET | Refills: 0 | Status: SHIPPED | OUTPATIENT
Start: 2023-03-14

## 2023-03-14 RX ORDER — LOSARTAN POTASSIUM 25 MG/1
TABLET ORAL
Qty: 90 TABLET | Refills: 3 | Status: SHIPPED | OUTPATIENT
Start: 2023-03-14

## 2023-03-14 SDOH — ECONOMIC STABILITY: INCOME INSECURITY: HOW HARD IS IT FOR YOU TO PAY FOR THE VERY BASICS LIKE FOOD, HOUSING, MEDICAL CARE, AND HEATING?: NOT HARD AT ALL

## 2023-03-14 SDOH — ECONOMIC STABILITY: FOOD INSECURITY: WITHIN THE PAST 12 MONTHS, YOU WORRIED THAT YOUR FOOD WOULD RUN OUT BEFORE YOU GOT MONEY TO BUY MORE.: NEVER TRUE

## 2023-03-14 SDOH — ECONOMIC STABILITY: HOUSING INSECURITY
IN THE LAST 12 MONTHS, WAS THERE A TIME WHEN YOU DID NOT HAVE A STEADY PLACE TO SLEEP OR SLEPT IN A SHELTER (INCLUDING NOW)?: NO

## 2023-03-14 ASSESSMENT — ENCOUNTER SYMPTOMS
CONSTIPATION: 0
BACK PAIN: 0
WHEEZING: 0
ABDOMINAL PAIN: 0
EYE REDNESS: 0
EYE DISCHARGE: 0
VOMITING: 0
FACIAL SWELLING: 0
SHORTNESS OF BREATH: 0
BLOOD IN STOOL: 0
ANAL BLEEDING: 0
SORE THROAT: 0
CHEST TIGHTNESS: 0
CHOKING: 0
APNEA: 0
DIARRHEA: 0
COUGH: 0
EYE ITCHING: 0
NAUSEA: 0
EYE PAIN: 0

## 2023-03-14 ASSESSMENT — PATIENT HEALTH QUESTIONNAIRE - PHQ9
1. LITTLE INTEREST OR PLEASURE IN DOING THINGS: 0
SUM OF ALL RESPONSES TO PHQ9 QUESTIONS 1 & 2: 0
SUM OF ALL RESPONSES TO PHQ QUESTIONS 1-9: 0
2. FEELING DOWN, DEPRESSED OR HOPELESS: 0
SUM OF ALL RESPONSES TO PHQ QUESTIONS 1-9: 0

## 2023-03-14 NOTE — PROGRESS NOTES
Amanda Hancock is a 74 y.o. male (: 1955) presenting to address:    Chief Complaint   Patient presents with    Follow-up       Vitals:    23 1525   BP: 130/88   Pulse: 83   Resp: 14   Temp: 98 °F (36.7 °C)   SpO2: 98%       Coordination of Care Questionaire:   1. \"Have you been to the ER, urgent care clinic since your last visit? Hospitalized since your last visit? \" No    2. \"Have you seen or consulted any other health care providers outside of the [address] [place] [address] since your last visit? \" No     3. For patients aged 39-70: Has the patient had a colonoscopy / FIT/ Cologuard? No      If the patient is female:    4. For patients aged 41-77: Has the patient had a mammogram within the past 2 years? NA - based on age or sex      11. For patients aged 21-65: Has the patient had a pap smear? NA - based on age or sex    Advanced Directive:   1. Do you have an Advanced Directive? Yes    2. Would you like information on Advanced Directives?  No

## 2023-03-14 NOTE — PROGRESS NOTES
Tyron Holm     Chief Complaint   Patient presents with    Follow-up     /88 (Site: Right Upper Arm, Position: Sitting, Cuff Size: Medium Adult)   Pulse 83   Temp 98 °F (36.7 °C) (Temporal)   Resp 14   Ht 5' 11\" (1.803 m)   Wt 136 lb (61.7 kg)   SpO2 98%   BMI 18.97 kg/m²         HPI:  Tyron Holm is here for follow up accompanied with his cousin  who is his power of  Robert Fernandez is living with his cousins  Patient has no emergency room visit no hospital admission no fever no chills no decreased appetite no weight loss    But he is complaining about pain in his private area she has had this pain has been going on for 1 months but he has not notified his cousins or informed them about the pain    Has been having sneezing ,rhinorrhea , no itching   And he has been taking Benadryl over-the-counter to help with his symptoms    Past Medical History:   Diagnosis Date    Blindness     Glaucoma     Hypercholesterolemia     Hypertension     Tremors of nervous system      Past Surgical History:   Procedure Laterality Date    COLONOSCOPY N/A 2018    COLONOSCOPY performed by Jenna Osorio MD at [facility] ENDOSCOPY    TOTAL HIP ARTHROPLASTY       Social History     Tobacco Use    Smoking status: Former     Packs/day: 1.00     Types: Cigarettes     Start date: 38     Quit date: 2018     Years since quittin.2    Smokeless tobacco: Never   Substance Use Topics    Alcohol use: No       No family history on file. Review of Systems   Constitutional:  Negative for activity change, appetite change, chills, diaphoresis, fatigue and fever. HENT:  Negative for congestion, ear discharge, ear pain, facial swelling, hearing loss, mouth sores, nosebleeds and sore throat. Eyes:  Positive for visual disturbance. Negative for pain, discharge, redness and itching. Patient is blind   Respiratory:  Negative for apnea, cough, choking, chest tightness, shortness of breath and wheezing. Gastrointestinal:  Negative for abdominal pain, anal bleeding, blood in stool, constipation, diarrhea, nausea and vomiting. Endocrine: Negative for cold intolerance and heat intolerance. Genitourinary:  Negative for difficulty urinating, dysuria, flank pain and frequency. Musculoskeletal:  Negative for arthralgias, back pain, gait problem, joint swelling and neck stiffness. Skin:  Negative for rash. Neurological:  Negative for dizziness, light-headedness and headaches. Psychiatric/Behavioral:  Negative for agitation, behavioral problems, confusion, sleep disturbance and suicidal ideas. The patient is not nervous/anxious. Physical Exam  Vitals and nursing note reviewed. Exam conducted with a chaperone present. Constitutional:       General: He is not in acute distress. Appearance: Normal appearance. He is normal weight. He is not ill-appearing, toxic-appearing or diaphoretic. HENT:      Right Ear: There is no impacted cerumen. Left Ear: There is no impacted cerumen. Nose: No congestion or rhinorrhea. Mouth/Throat:      Pharynx: No oropharyngeal exudate or posterior oropharyngeal erythema. Eyes:      Pupils: Pupils are equal, round, and reactive to light. Cardiovascular:      Pulses: Normal pulses. Heart sounds: Normal heart sounds. No murmur heard. Pulmonary:      Effort: Pulmonary effort is normal. No respiratory distress. Breath sounds: Normal breath sounds. No stridor. No wheezing or rhonchi. Chest:      Chest wall: No tenderness. Abdominal:      General: Abdomen is flat. Bowel sounds are normal. There is no distension. Palpations: There is no mass. Tenderness: There is no abdominal tenderness. There is no right CVA tenderness, left CVA tenderness, guarding or rebound. Hernia: No hernia is present.    Genitourinary:     Comments: Upon examining the patient patient has adult pull-ups that was stained with bloody stains, scrotum thickened skin with swelling and enlargement and there areas of bleeding and draining bloody discharge  The area was tender to touch  Musculoskeletal:         General: No swelling, tenderness or deformity. Normal range of motion. Cervical back: Normal range of motion and neck supple. No rigidity or tenderness. Right lower leg: No edema. Left lower leg: No edema. Skin:     General: Skin is warm and dry. Coloration: Skin is not jaundiced. Findings: No bruising, erythema, lesion or rash. Neurological:      General: No focal deficit present. Mental Status: He is alert and oriented to person, place, and time. Cranial Nerves: No cranial nerve deficit. Sensory: No sensory deficit. Motor: No weakness. Coordination: Coordination normal.      Gait: Gait normal.      Deep Tendon Reflexes: Reflexes normal.   Psychiatric:         Mood and Affect: Mood normal.         Behavior: Behavior normal.         Thought Content: Thought content normal.         Judgment: Judgment normal.        Assessment and plan     Plan of care has been discussed with the patient, he agrees to the plan and verbalized understanding. All his questions were answered  More than 50% of the time spent in this visit was counseling the patient about  illness and treatment options         ICD-10-CM    1. Scrotum swelling  N50.89 US SCROTUM AND TESTICLES   Possible underlying abscess or tumor or possible infection  Blood work and urine test has been sent and patient be started on antibiotics and to get an ultrasound as soon as possible  [facility] [place]     Urinalysis     Culture, Urine     ciprofloxacin (CIPRO) 500 MG tablet      2. Scrotum pain  N50.82 [address]     [facility] [place]     Urinalysis     Culture, Urine     ciprofloxacin (CIPRO) 500 MG tablet      3. Insomnia, unspecified type  G47.00    Stable on trazodone at night   4.  Legal blindness, as defined in [place]  H54.8       5. Essential (primary) hypertension  I10 Comprehensive Metabolic Panel   Blood pressures well controlled on current medications  atorvastatin (LIPITOR) 40 MG tablet     losartan (COZAAR) 25 MG tablet      6. Other iron deficiency anemias  D50.8 CBC with Auto Differential     ferrous sulfate (IRON 325) 325 (65 Fe) MG tablet      7. Vitamin D deficiency, unspecified  E55.9 ergocalciferol (ERGOCALCIFEROL) 1.25 MG (32349 UT) capsule   To reduce vitamin D intake to every 2 weeks   8. Mixed hyperlipidemia  E78.2 Comprehensive Metabolic Panel   Patient adherent to atorvastatin  atorvastatin (LIPITOR) 40 MG tablet      9. Allergic rhinitis, unspecified seasonality, unspecified trigger  J30.9 montelukast (SINGULAIR) 10 MG tablet      10. Nausea  R11.0 ondansetron (ZOFRAN-ODT) 8 MG TBDP disintegrating tablet      11. Orchitis and epididymitis  N45.3 ciprofloxacin (CIPRO) 500 MG tablet         Current Outpatient Medications   Medication Sig Dispense Refill    atorvastatin (LIPITOR) 40 MG tablet Take 1 tablet by mouth once daily for 90 days 90 tablet 0    losartan (COZAAR) 25 MG tablet Take 1 tablet by mouth once daily 90 tablet 3    montelukast (SINGULAIR) 10 MG tablet Take 1 tablet by mouth once daily 90 tablet 3    ondansetron (ZOFRAN-ODT) 8 MG TBDP disintegrating tablet Place 1 tablet under the tongue every 8 hours as needed for Nausea 30 tablet 3    ferrous sulfate (IRON 325) 325 (65 Fe) MG tablet TAKE 1 TABLET BY MOUTH ONCE DAILY BEFORE BREAKFAST 90 tablet 1    ergocalciferol (ERGOCALCIFEROL) 1.25 MG (65043 UT) capsule Take one every 14 days 90 days 6 capsule 1    ciprofloxacin (CIPRO) 500 MG tablet Take 1 tablet by mouth 2 times daily for 10 days 20 tablet 0    mometasone (ELOCON) 0.1 % cream APPLY 2 TO 4 GRAMS A THIN LAYER TOPICALLY ONCE DAILY  FOR 5 DAYS AT A TIME.      traZODone (DESYREL) 100 MG tablet Take 100 mg by mouth       No current facility-administered medications for this visit. Patient Active Problem List    Diagnosis Date Noted    Insomnia 04/25/2018    Mixed hyperlipidemia 04/25/2018    Legally blind 04/25/2018    Essential hypertension 04/25/2018     No results found for this visit on 03/14/23. No visits with results within 3 Month(s) from this visit. Latest known visit with results is:   Orders Only on 06/06/2022   Component Date Value Ref Range Status    Vitamin B-12 06/06/2022 447  211 - 911 pg/mL Final    Magnesium 06/06/2022 1.9  1.6 - 2.5 mg/dL Final    Triglycerides 06/06/2022 77  40 - 149 mg/dL Final    HDL 06/06/2022 53  >=40 mg/dL Final    Cholesterol, Total 06/06/2022 128  110 - 200 mg/dL Final    HDL 06/06/2022 2.4  0.0 - 5.0 NA Final    Non-HDL Cholesterol 06/06/2022 75  <130 mg/dL Final    LDL Calculated 06/06/2022 60  50 - 99 mg/dL Final    VLDL Cholesterol Calculated 06/06/2022 15  8 - 30 mg/dL Final    LDL/HDL Ratio 06/06/2022 1.1  NA Final    Comment: Test includes cholesterol, HDL cholesterol, triglycerides and LDL. Cholesterol Recommended NCEP guidelines in mg/dL:    Less than 200            Desirable  200 - 239                Borderline High  Greater than or  = 240   High      Please Note:  Total Chol/HDL Ratio                     Men     Women  1/2 Avg. Risk    3.4     3.3      Avg. Risk    5.0     4.4  2X  Avg. Risk    9.6     7.1  3X  Avg. Risk   23.4    11.0            Hemoglobin A1C 06/06/2022 5.5  4.8 - 5.6 % Final    AVERAGE GLUCOSE 06/06/2022 112  91 - 123 mg/dL Final    Comment: 5.7 - 6.4% is indicative of prediabetes. > 6.4% is indicative of diabetes.           Iron 06/06/2022 39 (A)  45 - 160 mcg/dL Final    UIBC 06/06/2022 176  110 - 370 mcg/dL Final    TIBC 06/06/2022 215 (A)  228 - 428 mcg/dL Final    Iron Saturation 06/06/2022 18 (A)  20 - 50 % Final    Vit D, 25-Hydroxy 06/06/2022 34.3  32.0 - 100.0 ng/mL Final    Glucose 06/06/2022 77  70 - 99 mg/dL Final    BUN 06/06/2022 19  6 - 22 mg/dL Final    Creatinine 06/06/2022 1.0  0.8 - 1.6 mg/dL Final    Sodium 06/06/2022 139  133 - 145 mmol/L Final    Potassium 06/06/2022 3.5  3.5 - 5.5 mmol/L Final    Chloride 06/06/2022 100  98 - 110 mmol/L Final    CO2 06/06/2022 26  20 - 32 mmol/L Final    AST 06/06/2022 24  10 - 37 U/L Final    ALT 06/06/2022 20  5 - 40 U/L Final    Alkaline Phosphatase 06/06/2022 102  40 - 125 U/L Final    Total Bilirubin 06/06/2022 0.3  0.2 - 1.2 mg/dL Final    Calcium 06/06/2022 9.0  8.4 - 10.5 mg/dL Final    Total Protein 06/06/2022 8.2 (A)  6.2 - 8.1 g/dL Final    Albumin 06/06/2022 3.8  3.5 - 5.0 g/dL Final    Albumin/Globulin Ratio 06/06/2022 0.9 (A)  1.1 - 2.6 ratio Final    Globulin 06/06/2022 4.4 (A)  2.0 - 4.0 g/dL Final    Anion Gap 06/06/2022 13.0  3.0 - 15.0 mmol/L Final    Comment: Anion Gap calculation based on electrolyte reference ranges. Test includes Albumin, Alkaline Phosphatase, ALT, AST, BUN, Calcium, CO2,  Chloride, Creatinine, Glucose, Potassium, Sodium, Total Bilirubin and Total  Protein. GLOMERULAR FILTRATION RATE, 64071 06/06/2022 >60.0  >60.0 mL/min/1.73 sq.m. Final    Comment: eGFR calculation based on the Chronic Kidney Disease Epidemiology   Collaboration  (CKD-EPI) equation refit without adjustment for race. This eGFR is validated for stable chronic renal failure patients. This   equation  is unreliable in acute illness or patients with normal renal function. Return in about 3 months (around 6/14/2023), or if symptoms worsen or fail to improve, for medicare wellness.

## 2023-03-16 LAB
BILIRUB SERPL-MCNC: NEGATIVE MG/DL
BLOOD: NEGATIVE
CLARITY: CLEAR
COLOR: YELLOW
GLUCOSE: NEGATIVE MG/DL
KETONES, URINE: NEGATIVE MG/DL
LEUKOCYTE ESTERASE, URINE: NEGATIVE
NITRITE, URINE: NEGATIVE
PH, URINE: 5.5 PH (ref 5–8)
PROTEIN UA: NEGATIVE MG/DL
SPECIFIC GRAVITY: 1.01 (ref 1–1.03)
UROBILINOGEN: 0.2 MG/DL

## 2023-05-19 ENCOUNTER — TELEPHONE (OUTPATIENT)
Dept: FAMILY MEDICINE CLINIC | Facility: CLINIC | Age: 68
End: 2023-05-19

## 2023-05-19 NOTE — TELEPHONE ENCOUNTER
Received hospital encounter notes for pt from 5/7/2023, per PCP she would like to know if patient is following up with urology.  Spoke w/ pt's cousin, Sy Oconnell who has POA over him and he stated that he has an appt scheduled to see urology on 5/24/2023 @ 2:20 p, w/ ADA Johnston.

## 2023-06-19 ENCOUNTER — OFFICE VISIT (OUTPATIENT)
Dept: FAMILY MEDICINE CLINIC | Facility: CLINIC | Age: 68
End: 2023-06-19
Payer: MEDICARE

## 2023-06-19 VITALS
WEIGHT: 126 LBS | BODY MASS INDEX: 17.64 KG/M2 | TEMPERATURE: 98 F | HEART RATE: 85 BPM | RESPIRATION RATE: 14 BRPM | DIASTOLIC BLOOD PRESSURE: 68 MMHG | SYSTOLIC BLOOD PRESSURE: 110 MMHG | HEIGHT: 71 IN | OXYGEN SATURATION: 100 %

## 2023-06-19 DIAGNOSIS — G47.00 INSOMNIA, UNSPECIFIED TYPE: ICD-10-CM

## 2023-06-19 DIAGNOSIS — I10 ESSENTIAL (PRIMARY) HYPERTENSION: ICD-10-CM

## 2023-06-19 DIAGNOSIS — D50.8 OTHER IRON DEFICIENCY ANEMIAS: ICD-10-CM

## 2023-06-19 DIAGNOSIS — Z00.00 MEDICARE ANNUAL WELLNESS VISIT, SUBSEQUENT: Primary | ICD-10-CM

## 2023-06-19 DIAGNOSIS — R63.4 WEIGHT LOSS: ICD-10-CM

## 2023-06-19 DIAGNOSIS — N50.89 SCROTUM SWELLING: ICD-10-CM

## 2023-06-19 DIAGNOSIS — E78.2 MIXED HYPERLIPIDEMIA: ICD-10-CM

## 2023-06-19 DIAGNOSIS — H54.8 LEGALLY BLIND: ICD-10-CM

## 2023-06-19 DIAGNOSIS — R63.0 DECREASED APPETITE: ICD-10-CM

## 2023-06-19 DIAGNOSIS — K08.139: ICD-10-CM

## 2023-06-19 PROCEDURE — 3078F DIAST BP <80 MM HG: CPT | Performed by: LEGAL MEDICINE

## 2023-06-19 PROCEDURE — G0439 PPPS, SUBSEQ VISIT: HCPCS | Performed by: LEGAL MEDICINE

## 2023-06-19 PROCEDURE — 3074F SYST BP LT 130 MM HG: CPT | Performed by: LEGAL MEDICINE

## 2023-06-19 PROCEDURE — 1123F ACP DISCUSS/DSCN MKR DOCD: CPT | Performed by: LEGAL MEDICINE

## 2023-06-19 RX ORDER — ATORVASTATIN CALCIUM 40 MG/1
TABLET, FILM COATED ORAL
Qty: 90 TABLET | Refills: 3 | Status: SHIPPED | OUTPATIENT
Start: 2023-06-19

## 2023-06-19 RX ORDER — LACTOSE-REDUCED FOOD
1 LIQUID (ML) ORAL 2 TIMES DAILY
Qty: 180 EACH | Refills: 3 | Status: SHIPPED | OUTPATIENT
Start: 2023-06-19 | End: 2023-09-17

## 2023-06-19 RX ORDER — TRAZODONE HYDROCHLORIDE 100 MG/1
100 TABLET ORAL NIGHTLY
Qty: 90 TABLET | Refills: 3 | Status: SHIPPED | OUTPATIENT
Start: 2023-06-19 | End: 2024-06-13

## 2023-06-19 SDOH — ECONOMIC STABILITY: FOOD INSECURITY: WITHIN THE PAST 12 MONTHS, YOU WORRIED THAT YOUR FOOD WOULD RUN OUT BEFORE YOU GOT MONEY TO BUY MORE.: PATIENT DECLINED

## 2023-06-19 SDOH — ECONOMIC STABILITY: HOUSING INSECURITY
IN THE LAST 12 MONTHS, WAS THERE A TIME WHEN YOU DID NOT HAVE A STEADY PLACE TO SLEEP OR SLEPT IN A SHELTER (INCLUDING NOW)?: PATIENT REFUSED

## 2023-06-19 SDOH — ECONOMIC STABILITY: FOOD INSECURITY: WITHIN THE PAST 12 MONTHS, THE FOOD YOU BOUGHT JUST DIDN'T LAST AND YOU DIDN'T HAVE MONEY TO GET MORE.: PATIENT DECLINED

## 2023-06-19 SDOH — ECONOMIC STABILITY: INCOME INSECURITY: HOW HARD IS IT FOR YOU TO PAY FOR THE VERY BASICS LIKE FOOD, HOUSING, MEDICAL CARE, AND HEATING?: PATIENT DECLINED

## 2023-06-19 ASSESSMENT — LIFESTYLE VARIABLES
HOW OFTEN DO YOU HAVE A DRINK CONTAINING ALCOHOL: NEVER
HOW MANY STANDARD DRINKS CONTAINING ALCOHOL DO YOU HAVE ON A TYPICAL DAY: PATIENT DOES NOT DRINK

## 2023-06-19 NOTE — PROGRESS NOTES
Patrick Garcia is a 79 y.o. male (: 1955) presenting to address:    Chief Complaint   Patient presents with    Medicare AWV       Vitals:    23 1444   BP: 110/68   Pulse: 85   Resp: 14   Temp: 98 °F (36.7 °C)   SpO2: 100%       Coordination of Care Questionaire:   1. \"Have you been to the ER, urgent care clinic since your last visit? Hospitalized since your last visit? \" Yes When: 2023 Where: [facility]  Reason for visit: Urology problems     2. \"Have you seen or consulted any other health care providers outside of the [address] [place] [address] since your last visit? \" No     3. For patients aged 39-70: Has the patient had a colonoscopy / FIT/ Cologuard? Yes - Care Gap present. Most recent result on file      If the patient is female:    4. For patients aged 41-77: Has the patient had a mammogram within the past 2 years? NA - based on age or sex      11. For patients aged 21-65: Has the patient had a pap smear? NA - based on age or sex    Advanced Directive:   1. Do you have an Advanced Directive? Yes    2. Would you like information on Advanced Directives?  No

## 2023-06-19 NOTE — PROGRESS NOTES
Medicare Annual Wellness Visit    Edilson Rose is here for Medicare AWV  S here with his cousin who is the power of      He need to be fed can take a both but not really able to clean himself well   Legally blind , hi cousin help with preparing food and laundry       Assessment & Plan   Medicare annual wellness visit, subsequent  -     Lipid Panel; Future  -     Comprehensive Metabolic Panel; Future  -     CBC with Auto Differential; Future  Essential (primary) hypertension  -     atorvastatin (LIPITOR) 40 MG tablet; Take 1 tablet by mouth once daily for 90 days, Disp-90 tablet, R-3Normal  Mixed hyperlipidemia  -     Lipid Panel; Future  -     Comprehensive Metabolic Panel; Future  -     atorvastatin (LIPITOR) 40 MG tablet; Take 1 tablet by mouth once daily for 90 days, Disp-90 tablet, R-3Normal  Legally blind  -     Nutritional Supplements (ENSURE) LIQD; Take 1 Can by mouth in the morning and at bedtime, Disp-180 each, R-3Normal  -     [facility]  Insomnia, unspecified type  -     traZODone (DESYREL) 100 MG tablet;  Take 1 tablet by mouth nightly, Disp-90 tablet, R-3Normal  Scrotum swelling  Other iron deficiency anemias  Weight loss  -     Nutritional Supplements (ENSURE) LIQD; Take 1 Can by mouth in the morning and at bedtime, Disp-180 each, R-3Normal  -     [facility]  Decreased appetite  He lost 10 pound since last visit in march   -     Nutritional Supplements (ENSURE) LIQD; Take 1 Can by mouth in the morning and at bedtime, Disp-180 each, R-3Normal  -     [facility]  Complete loss of teeth due to caries, unspecified edentulism class  -     [facility]  Recommendations for [employer] Due: see orders and patient instructions/AVS.  Recommended screening schedule for the next 5-10 years is provided to the patient in written form: see Patient Instructions/AVS.     Return

## 2023-06-19 NOTE — PATIENT INSTRUCTIONS
the toothbrush. Their hands and fingers may be stiff, painful, or weak. If this is the case, you can: Offer an electric toothbrush. Enlarge the handle of a non-electric toothbrush by wrapping a sponge, an elastic bandage, or adhesive tape around it. Push the toothbrush handle through a ball made of rubber or soft foam.  Make the handle longer and thicker by taping Popsicle sticks or tongue depressors to it. You may also be able to buy special toothbrushes, toothpaste dispensers, and floss holders. Your doctor may recommend a soft-bristle toothbrush if the person you care for bleeds easily. Bleeding can happen because of a health problem or from certain medicines. A toothpaste for sensitive teeth may help if the person you care for has sensitive teeth. How do you brush and floss someone's teeth? If the person you are caring for has a hard time cleaning their teeth on their own, you may need to brush and floss their teeth for them. It may be easiest to have the person sit and face away from you, and to sit or stand behind them. That way you can steady their head against your arm as you reach around to floss and brush their teeth. Choose a place that has good lighting and is comfortable for both of you. Before you begin, gather your supplies. You will need gloves, floss, a toothbrush, and a container to hold water if you are not near a sink. Wash and dry your hands well and put on gloves. Start by flossing:  Gently work a piece of floss between each of the teeth toward the gums. A plastic flossing tool may make this easier, and they are available at [facility]. Curve the floss around each tooth into a U-shape and gently slide it under the gum line. Move the floss firmly up and down several times to scrape off the plaque. After you've finished flossing, throw away the used floss and begin brushing:  Wet the brush and apply toothpaste. Place the brush at a 45-degree angle where the teeth meet the gums.

## 2023-06-21 ENCOUNTER — TELEPHONE (OUTPATIENT)
Dept: FAMILY MEDICINE CLINIC | Facility: CLINIC | Age: 68
End: 2023-06-21

## 2023-08-09 ENCOUNTER — TELEPHONE (OUTPATIENT)
Dept: FAMILY MEDICINE CLINIC | Facility: CLINIC | Age: 68
End: 2023-08-09

## 2023-08-09 NOTE — TELEPHONE ENCOUNTER
----- Message from Cristian Garza sent at 8/9/2023  3:33 PM EDT -----  Subject: Message to Provider    QUESTIONS  Information for Provider? Sasha Jimenes from [facility] is requesting last   not from appointment on 6-19-23 @ 2:30 be faxed to her @ [phone] /   phone [phone]  ---------------------------------------------------------------------------  --------------  Sukhdev Marine Edwin  [phone]; OK to leave message on voicemail  ---------------------------------------------------------------------------  --------------  SCRIPT ANSWERS  Relationship to Patient? Covered Entity  Covered Entity Type? Hospital?  Representative Name?  Christie Plasencia

## (undated) DEVICE — DEVICE INFL 60ML 12ATM CONVENIENT LOK REL HNDL HI PRSS FLX

## (undated) DEVICE — MEDI-VAC NON-CONDUCTIVE SUCTION TUBING: Brand: CARDINAL HEALTH

## (undated) DEVICE — FLEX ADVANTAGE 1500CC: Brand: FLEX ADVANTAGE

## (undated) DEVICE — KIT COLON W/ 1.1OZ LUB AND 2 END

## (undated) DEVICE — KENDALL 500 SERIES DIAPHORETIC FOAM MONITORING ELECTRODE - TEAR DROP SHAPE ( 30/PK): Brand: KENDALL

## (undated) DEVICE — BASIN EMESIS 500CC ROSE 250/CS 60/PLT: Brand: MEDEGEN MEDICAL PRODUCTS, LLC

## (undated) DEVICE — SYR 50ML SLIP TIP NSAF LF STRL --